# Patient Record
Sex: MALE | Race: WHITE | NOT HISPANIC OR LATINO | Employment: OTHER | URBAN - METROPOLITAN AREA
[De-identification: names, ages, dates, MRNs, and addresses within clinical notes are randomized per-mention and may not be internally consistent; named-entity substitution may affect disease eponyms.]

---

## 2018-01-24 ENCOUNTER — OFFICE VISIT (OUTPATIENT)
Dept: URGENT CARE | Facility: CLINIC | Age: 38
End: 2018-01-24
Payer: COMMERCIAL

## 2018-01-24 VITALS
DIASTOLIC BLOOD PRESSURE: 84 MMHG | BODY MASS INDEX: 25.18 KG/M2 | TEMPERATURE: 99 F | WEIGHT: 190 LBS | RESPIRATION RATE: 14 BRPM | HEIGHT: 73 IN | SYSTOLIC BLOOD PRESSURE: 126 MMHG | OXYGEN SATURATION: 99 % | HEART RATE: 95 BPM

## 2018-01-24 DIAGNOSIS — J02.9 PHARYNGITIS, UNSPECIFIED ETIOLOGY: Primary | ICD-10-CM

## 2018-01-24 PROCEDURE — 99213 OFFICE O/P EST LOW 20 MIN: CPT | Mod: S$GLB,,, | Performed by: NURSE PRACTITIONER

## 2018-01-24 RX ORDER — AMOXICILLIN AND CLAVULANATE POTASSIUM 875; 125 MG/1; MG/1
1 TABLET, FILM COATED ORAL 2 TIMES DAILY
Qty: 20 TABLET | Refills: 0 | Status: SHIPPED | OUTPATIENT
Start: 2018-01-24 | End: 2018-02-03

## 2018-01-24 NOTE — PROGRESS NOTES
"Subjective:       Patient ID: Michael Zuñiga is a 37 y.o. male.    Vitals:  height is 6' 1" (1.854 m) and weight is 86.2 kg (190 lb). His temperature is 98.8 °F (37.1 °C). His blood pressure is 126/84 and his pulse is 95. His respiration is 14 and oxygen saturation is 99%.     Chief Complaint: Sore Throat    Pt is c/o sore throat and pain swallowing that started saturday. Pt also has sinus congestion that started two weeks ago. Pt tried a decongestant  once but it didn't help. Pt denies ear symptoms. Pt says he has hx of strep. Pt denies coughing.      Sore Throat    This is a new problem. The current episode started in the past 7 days. The problem has been gradually worsening. The pain is worse on the left side. The maximum temperature recorded prior to his arrival was 100.4 - 100.9 F. The pain is moderate. Associated symptoms include congestion, coughing and headaches. Pertinent negatives include no abdominal pain, ear discharge, ear pain, hoarse voice or shortness of breath. He has had exposure to strep. The treatment provided no relief.     Review of Systems   Constitution: Positive for fever and malaise/fatigue. Negative for chills.   HENT: Positive for congestion and sore throat. Negative for ear discharge, ear pain and hoarse voice.    Eyes: Negative for discharge and redness.   Cardiovascular: Negative for chest pain, dyspnea on exertion and leg swelling.   Respiratory: Positive for cough. Negative for shortness of breath, sputum production and wheezing.    Musculoskeletal: Positive for myalgias.   Gastrointestinal: Negative for abdominal pain and nausea.   Neurological: Positive for headaches.       Objective:      Physical Exam   Constitutional: He is oriented to person, place, and time. He appears well-developed and well-nourished. He is cooperative.  Non-toxic appearance. He does not appear ill. No distress.   HENT:   Head: Normocephalic and atraumatic.   Right Ear: Hearing, tympanic membrane, " external ear and ear canal normal.   Left Ear: Hearing, tympanic membrane, external ear and ear canal normal.   Nose: Nose normal. No mucosal edema, rhinorrhea or nasal deformity. No epistaxis. Right sinus exhibits no maxillary sinus tenderness and no frontal sinus tenderness. Left sinus exhibits no maxillary sinus tenderness and no frontal sinus tenderness.   Mouth/Throat: Uvula is midline, oropharynx is clear and moist and mucous membranes are normal. No trismus in the jaw. Normal dentition. No uvula swelling. No posterior oropharyngeal erythema. Tonsillar exudate (right tonsil exudate with both tonsils being beefy red. ).   Eyes: Conjunctivae and lids are normal. No scleral icterus.   Sclera clear bilat   Neck: Trachea normal, full passive range of motion without pain and phonation normal. Neck supple.   Cardiovascular: Normal rate, regular rhythm, normal heart sounds, intact distal pulses and normal pulses.    Pulmonary/Chest: Effort normal and breath sounds normal. No respiratory distress.   Abdominal: Soft. Normal appearance and bowel sounds are normal. He exhibits no distension. There is no tenderness.   Musculoskeletal: Normal range of motion. He exhibits no edema or deformity.   Neurological: He is alert and oriented to person, place, and time. He exhibits normal muscle tone. Coordination normal.   Skin: Skin is warm, dry and intact. He is not diaphoretic. No pallor.   Psychiatric: He has a normal mood and affect. His speech is normal and behavior is normal. Judgment and thought content normal. Cognition and memory are normal.   Nursing note and vitals reviewed.      Assessment:       1. Pharyngitis, unspecified etiology        Plan:         Pharyngitis, unspecified etiology  -     amoxicillin-clavulanate 875-125mg (AUGMENTIN) 875-125 mg per tablet; Take 1 tablet by mouth 2 (two) times daily.  Dispense: 20 tablet; Refill: 0  -     CULTURE, RESPIRATORY  - THROAT  -     C. Trachomatis/N. Gonorrhoeae by AMP  DNA (Throat)

## 2018-01-24 NOTE — PATIENT INSTRUCTIONS
When You Have a Sore Throat    A sore throat can be painful. There are many reasons why you may have a sore throat. Your healthcare provider will work with you to find the cause of your sore throat. He or she will also find the best treatment for you.  What causes a sore throat?  Sore throats can be caused or worsened by:  · Cold or flu viruses  · Bacteria  · Irritants such as tobacco smoke or air pollution  · Acid reflux  A healthy throat  The tonsils are on the sides of the throat near the base of the tongue. They collect viruses and bacteria and help fight infection. The throat (pharynx) is the passage for air. Mucus from the nasal cavity also moves down the passage.  An inflamed throat  The tonsils and pharynx can become inflamed due to a cold or flu virus. Postnasal drip (excess mucus draining from the nasal cavity) can irritate the throat. It can also make the throat or tonsils more likely to be infected by bacteria. Severe, untreated tonsillitis in children or adults can cause a pocket of pus (abscess) to form near the tonsil.  Your evaluation  A medical evaluation can help find the cause of your sore throat. It can also help your healthcare provider choose the best treatment for you. The evaluation may include a health history, physical exam, and diagnostic tests.  Health history  Your healthcare provider may ask you the following:  · How long has the sore throat lasted and how have you been treating it?  · Do you have any other symptoms, such as body aches, fever, or cough?  · Does your sore throat recur? If so, how often? How many days of school or work have you missed because of a sore throat?  · Do you have trouble eating or swallowing?  · Have you been told that you snore or have other sleep problems?  · Do you have bad breath?  · Do you cough up bad-tasting mucus?  Physical exam  During the exam, your healthcare provider checks your ears, nose, and throat for problems. He or she also checks for  "swelling in the neck, and may listen to your chest.  Possible tests  Other tests your healthcare provider may perform include:  · A throat swab to check for bacteria such as streptococcus (the bacteria that causes strep throat)  · A blood test to check for mononucleosis (a viral infection)  · A chest X-ray to rule out pneumonia, especially if you have a cough  Treating a sore throat  Treatment depends on many factors. What is the likely cause? Is the problem recent? Does it keep coming back? In many cases, the best thing to do is to treat the symptoms, rest, and let the problem heal itself. Antibiotics may help clear up some bacterial infections. For cases of severe or recurring tonsillitis, the tonsils may need to be removed.  Relieving your symptoms  · Dont smoke, and avoid secondhand smoke.  · For children, try throat sprays or Popsicles. Adults and older children may try lozenges.  · Drink warm liquids to soothe the throat and help thin mucus. Avoid alcohol, spicy foods, and acidic drinks such as orange juice. These can irritate the throat.  · Gargle with warm saltwater (1 teaspoon of salt to 8 ounces of warm water).  · Use a humidifier to keep air moist and relieve throat dryness.  · Try over-the-counter pain relievers such as acetaminophen or ibuprofen. Use as directed, and dont exceed the recommended dose. Dont give aspirin to children.   Are antibiotics needed?  If your sore throat is due to a bacterial infection, antibiotics may speed healing and prevent complications. Although group A streptococcus ("strep throat" or GAS) is the major treatable infection for a sore throat, GAS causes only 5% to 15% of sore throats in adults who seek medical care. Most sore throats are caused by cold or flu viruses. And antibiotics dont treat viral illness. In fact, using antibiotics when theyre not needed may produce bacteria that are harder to kill. Your healthcare provider will prescribe antibiotics only if he or " she thinks they are likely to help.  If antibiotics are prescribed  Take the medicine exactly as directed. Be sure to finish your prescription even if youre feeling better. And be sure to ask your healthcare provider or pharmacist what side effects are common and what to do about them.  Is surgery needed?  In some cases, tonsils need to be removed. This is often done as outpatient (same-day) surgery. Your healthcare provider may advise removing the tonsils in cases of:  · Several severe bouts of tonsillitis in a year. Severe episodes include those that lead to missed days of school or work, or that need to be treated with antibiotics.  · Tonsillitis that causes breathing problems during sleep  · Tonsillitis caused by food particles collecting in pouches in the tonsils (cryptic tonsillitis)  Call your healthcare provider if any of the following occur:  · Symptoms worsen, or new symptoms develop.  · Swollen tonsils make breathing difficult.  · The pain is severe enough to keep you from drinking liquids.  · A skin rash, hives, or wheezing develops. Any of these could signal an allergic reaction to antibiotics.  · Symptoms dont improve within a week.  · Symptoms dont improve within 2 to 3 days of starting antibiotics.   Date Last Reviewed: 10/1/2016  © 1507-3424 [employer]. [address] [place] [address], [place] [address]. All rights reserved. This information is not intended as a substitute for professional medical care. Always follow your healthcare professional's instructions.

## 2018-01-27 ENCOUNTER — TELEPHONE (OUTPATIENT)
Dept: URGENT CARE | Facility: CLINIC | Age: 38
End: 2018-01-27

## 2018-01-29 ENCOUNTER — TELEPHONE (OUTPATIENT)
Dept: URGENT CARE | Facility: CLINIC | Age: 38
End: 2018-01-29

## 2018-01-29 LAB
C TRACH RRNA NPH QL NAA+PROBE: NEGATIVE
N GONORRHOEA RRNA NPH QL NAA+PROBE: NEGATIVE
S PYO THROAT QL CULT: NEGATIVE

## 2018-02-07 ENCOUNTER — TELEPHONE (OUTPATIENT)
Dept: URGENT CARE | Facility: CLINIC | Age: 38
End: 2018-02-07

## 2018-02-07 NOTE — TELEPHONE ENCOUNTER
Per dr. gonzalez - pt has to be seen cannot call in rx for abx    spoke with pt, says he took some of the abx and then stopped and lost them, states he was feeling much better and culture was negative, pt did not have any concerns on january 29th. pt called back today reporting he is not feeling so well again and would like us to just call in more abx, pt was told he should come in for follow up or re-evaluation before we rx more meds that may not be necessary since he has new symptoms and signs as well as over 13 days since his initial visit date.

## 2018-02-07 NOTE — TELEPHONE ENCOUNTER
PATIENT STATES HE LOST HIS RX OF ANTIBIOTIC. DID NOT TAKE ANY AT TIME GIVEN. WOULD LIKE IT CALLED IN AGAIN.

## 2018-07-04 ENCOUNTER — OFFICE VISIT (OUTPATIENT)
Dept: URGENT CARE | Facility: CLINIC | Age: 38
End: 2018-07-04
Payer: COMMERCIAL

## 2018-07-04 VITALS
TEMPERATURE: 98 F | DIASTOLIC BLOOD PRESSURE: 80 MMHG | BODY MASS INDEX: 25.18 KG/M2 | RESPIRATION RATE: 16 BRPM | HEIGHT: 73 IN | SYSTOLIC BLOOD PRESSURE: 120 MMHG | HEART RATE: 84 BPM | OXYGEN SATURATION: 98 % | WEIGHT: 190 LBS

## 2018-07-04 DIAGNOSIS — J02.9 SORE THROAT: Primary | ICD-10-CM

## 2018-07-04 DIAGNOSIS — J02.9 PHARYNGITIS, UNSPECIFIED ETIOLOGY: ICD-10-CM

## 2018-07-04 DIAGNOSIS — H66.91 RIGHT OTITIS MEDIA, UNSPECIFIED OTITIS MEDIA TYPE: ICD-10-CM

## 2018-07-04 DIAGNOSIS — J03.90 TONSILLITIS: ICD-10-CM

## 2018-07-04 LAB
CTP QC/QA: YES
S PYO RRNA THROAT QL PROBE: NEGATIVE

## 2018-07-04 PROCEDURE — 87591 N.GONORRHOEAE DNA AMP PROB: CPT

## 2018-07-04 PROCEDURE — 3008F BODY MASS INDEX DOCD: CPT | Mod: CPTII,S$GLB,, | Performed by: NURSE PRACTITIONER

## 2018-07-04 PROCEDURE — 87880 STREP A ASSAY W/OPTIC: CPT | Mod: QW,S$GLB,, | Performed by: NURSE PRACTITIONER

## 2018-07-04 PROCEDURE — 87070 CULTURE OTHR SPECIMN AEROBIC: CPT

## 2018-07-04 PROCEDURE — 99214 OFFICE O/P EST MOD 30 MIN: CPT | Mod: S$GLB,,, | Performed by: NURSE PRACTITIONER

## 2018-07-04 RX ORDER — AZITHROMYCIN 250 MG/1
TABLET, FILM COATED ORAL
Qty: 2 TABLET | Refills: 0 | Status: SHIPPED | OUTPATIENT
Start: 2018-07-04 | End: 2018-07-09

## 2018-07-04 RX ORDER — AZITHROMYCIN 250 MG/1
TABLET, FILM COATED ORAL
Qty: 2 TABLET | Refills: 0 | Status: SHIPPED | OUTPATIENT
Start: 2018-07-04 | End: 2018-07-04 | Stop reason: SDUPTHER

## 2018-07-04 RX ORDER — AMOXICILLIN AND CLAVULANATE POTASSIUM 875; 125 MG/1; MG/1
1 TABLET, FILM COATED ORAL 2 TIMES DAILY
Qty: 20 TABLET | Refills: 0 | Status: SHIPPED | OUTPATIENT
Start: 2018-07-04 | End: 2018-07-04 | Stop reason: ALTCHOICE

## 2018-07-04 NOTE — PROGRESS NOTES
"Subjective:       Patient ID: Michael Zuñiga is a 37 y.o. male.    Vitals:  height is 6' 1" (1.854 m) and weight is 86.2 kg (190 lb). His temperature is 97.5 °F (36.4 °C). His blood pressure is 120/80 and his pulse is 84. His respiration is 16 and oxygen saturation is 98%.     Chief Complaint: Sore Throat    Pt in for sore throat. Had strep about 2 weeks ago, pt took full corse of abx, forgot the name. Pt gets strep throat a lot. Just saw ENT on Monday to get his tonsils taken out. Pt and his partner made out with a random person, so not sure if he got symptoms from that. Pt denies taking anything for symptoms. They started last night. Pt is from here.       Sore Throat    This is a new problem. The problem has been gradually worsening. The pain is at a severity of 6/10. Associated symptoms include trouble swallowing. Pertinent negatives include no abdominal pain, congestion, coughing, diarrhea, ear pain, headaches, hoarse voice, plugged ear sensation, neck pain, shortness of breath or swollen glands.     Review of Systems   Constitution: Negative for chills, fever and malaise/fatigue.   HENT: Positive for sore throat and trouble swallowing. Negative for congestion, ear pain and hoarse voice.    Eyes: Negative for discharge and redness.   Cardiovascular: Negative for chest pain, dyspnea on exertion and leg swelling.   Respiratory: Negative for cough, shortness of breath, sputum production and wheezing.    Musculoskeletal: Negative for myalgias and neck pain.   Gastrointestinal: Negative for abdominal pain, diarrhea and nausea.   Neurological: Negative for headaches.       Objective:      Physical Exam   Constitutional: He is oriented to person, place, and time. Vital signs are normal. He appears well-developed and well-nourished. He is cooperative.  Non-toxic appearance. He does not have a sickly appearance. He does not appear ill. No distress.   HENT:   Head: Normocephalic and atraumatic.   Right Ear: Hearing, " external ear and ear canal normal. Tympanic membrane is erythematous and bulging.   Left Ear: Hearing, external ear and ear canal normal. Tympanic membrane is bulging.   Nose: Mucosal edema and rhinorrhea present. No nasal deformity. No epistaxis. Right sinus exhibits no maxillary sinus tenderness and no frontal sinus tenderness. Left sinus exhibits no maxillary sinus tenderness and no frontal sinus tenderness.   Mouth/Throat: Uvula is midline and mucous membranes are normal. No trismus in the jaw. Normal dentition. No uvula swelling. Posterior oropharyngeal erythema present. Tonsils are 2+ on the right. Tonsils are 2+ on the left. Tonsillar exudate (left tonsillar exudate noted).   Eyes: Conjunctivae, EOM and lids are normal. Pupils are equal, round, and reactive to light. No scleral icterus.   Sclera clear bilat   Neck: Trachea normal, normal range of motion, full passive range of motion without pain and phonation normal. Neck supple.   Cardiovascular: Normal rate, regular rhythm, S1 normal, S2 normal, normal heart sounds, intact distal pulses and normal pulses.    Pulmonary/Chest: Effort normal and breath sounds normal. No respiratory distress. He has no decreased breath sounds. He has no wheezes. He has no rhonchi. He has no rales.   Abdominal: Soft. Normal appearance and bowel sounds are normal. He exhibits no distension. There is no tenderness.   Musculoskeletal: Normal range of motion. He exhibits no edema or deformity.   Lymphadenopathy:     He has cervical adenopathy.        Right cervical: Superficial cervical adenopathy present.        Left cervical: Superficial cervical adenopathy present.   Neurological: He is alert and oriented to person, place, and time. He exhibits normal muscle tone. Coordination normal.   Skin: Skin is warm, dry and intact. No rash noted. He is not diaphoretic. No pallor.   Psychiatric: He has a normal mood and affect. His speech is normal and behavior is normal. Judgment and  thought content normal. Cognition and memory are normal.   Nursing note and vitals reviewed.      Office Visit on 07/04/2018   Component Date Value Ref Range Status    Rapid Strep A Screen 07/04/2018 Negative  Negative Final     Acceptable 07/04/2018 Yes   Final     Assessment:       1. Sore throat    2. Tonsillitis    3. Right otitis media, unspecified otitis media type        Plan:       37 year old presents to the clinic with sore throat. Reports recently seen by ENT and treated for strep throat 1 week ago.  States the ENT saw him again on Monday and discussed surgery to remove tonsils.  Patient also report recent oral sex with a random luanne this week.     Rapid Strep negative.  We discussed completing a throat culture and GC culture.  Patient agrees with plan of care.  Had extensive discussion of concerns related to viral vs bacterial pharyngitis.  Patient also concerned and asked about being a strep carrier.  Advised to follow up with his ENT regarding this concern.      Due to the risk factors for GC infection and current OM, covering with Azithromycin at ths time.  Also informed that if additional treatment is needed when cultures return, we will manage accordingaly at that time.  Reviewed remaining discharge instructions.  Patient verbalized understanding and agrees with plan of care.      Sore throat  -     POCT rapid strep A    Tonsillitis  -     Throat culture  -     Misc. C trach/N gonor Amplified RNA Throat  -     azithromycin (ZITHROMAX Z-ITZEL) 250 MG tablet; Take 2 tablets (500 mg) on  Day 1,  followed by 1 tablet (250 mg) once daily on Days 2 through 5.  Dispense: 2 tablet; Refill: 0    Right otitis media, unspecified otitis media type  -     Discontinue: amoxicillin-clavulanate 875-125mg (AUGMENTIN) 875-125 mg per tablet; Take 1 tablet by mouth 2 (two) times daily. for 10 days  Dispense: 20 tablet; Refill: 0  -     azithromycin (ZITHROMAX Z-ITZEL) 250 MG tablet; Take 2 tablets (500 mg)  on  Day 1,  followed by 1 tablet (250 mg) once daily on Days 2 through 5.  Dispense: 2 tablet; Refill: 0      Patient Instructions   Ear Infection:  Take full course of antibiotics as prescribed.  Humidifier use at home.  Warm compresses to affected ear  Elevate head on a pillow at night   Flonase Nasal Spray as directed for nasal congestion  Over the Counter Claritin or Zyrtec (plain) as directed for allergy symptoms  Tylenol or Motrin every 4 - 6 hours as needed for fever or ear pain.  Follow up with your PCP in 1 week of initiating antibiotics or sooner for no improvement in symptoms  Follow up in the ER for any worsening of symptoms such as new fever, increasing ear pain, neck stiffness, shortness of breath, etc.  If you smoke, stop smoking.                                                            Pharyngitis   If your condition worsens or fails to improve we recommend that you receive another evaluation at the ER immediately or contact your PCP to discuss your concerns or return here. You must understand that you've received an urgent care treatment only and that you may be released before all your medical problems are known or treated. You the patient will arrange for followup care as instructed.   The majority of all sore throats or tonsillitis are viral and antibiotics will not treat this.     If the strep test performed in office was Positive:  - Complete the full course of antibiotics given  - Drink plenty of cool liquids while avoid any beverage or food that can irritate your throat (acidic, spicy or salty foods).  - Throw away your toothbrush now and when you complete your antibiotics.    If the strep culture was done and returns negative in 3-5 days and you are still having a sore throat, you may need to get a mono spot test done or repeated.   Tylenol or ibuprofen for pain may help as long as you are not allergic to these meds or have a medical condition such as stomach ulcers, liver or kidney  disease or taking blood thinners etc that would   prevent you from using these medications.   Rest and fluids will help as well.   If you were prescribed antibiotics and are female and on BCP use additional methods to prevent pregnancy while on the antibiotics and for one cycle after.     STD testing  You were tested for the following STDs on today: Gonorrhea and Chlamydia   As far the STD testing, we may hold off on any medications till the labs result. We will phone in medication for you if needed.  If we have decided to treat you now be sure to take all the meds as directed.  If you need more meds when the labs result we will call you and phone them in for you.  If you do test positive for STDs you should be retested in about 6 weeks again in 6 monts to ensure true negative results.    Increase condom use to prevent further occurance.  Notify sexual partners of the need for testing.  Complete ALL medication prescribed.  NO sexual intercourse for 7 days after treatment.      Today's testing will give no crediable information if you have unprotected sexual activities going forward.     Middle Ear Infection (Adult)  You have an infection of the middle ear, the space behind the eardrum. This is also called acute otitis media (AOM). Sometimes it is caused by the common cold. This is because congestion can block the internal passage (eustachian tube) that drains fluid from the middle ear. When the middle ear fills with fluid, bacteria can grow there and cause an infection. Oral antibiotics are used to treat this illness, not ear drops. Symptoms usually start to improve within 1 to 2 days of treatment.    Home care  The following are general care guidelines:  · Finish all of the antibiotic medicine given, even though you may feel better after the first few days.  · You may use over-the-counter medicine, such as acetaminophen or ibuprofen, to control pain and fever, unless something else was prescribed. If you have  chronic liver or kidney disease or have ever had a stomach ulcer or gastrointestinal bleeding, talk with your healthcare provider before using these medicines. Do not give aspirin to anyone under 18 years of age who has a fever. It may cause severe illness or death.  Follow-up care  Follow up with your healthcare provider, or as advised, in 2 weeks if all symptoms have not gotten better, or if hearing doesn't go back to normal within 1 month.  When to seek medical advice  Call your healthcare provider right away if any of these occur:  · Ear pain gets worse or does not improve after 3 days of treatment  · Unusual drowsiness or confusion  · Neck pain, stiff neck, or headache  · Fluid or blood draining from the ear canal  · Fever of 100.4°F (38°C) or as advised   · Seizure  Date Last Reviewed: 6/1/2016 © 2000-2017 [employer]. [address] [place] [address]. All rights reserved. This information is not intended as a substitute for professional medical care. Always follow your healthcare professional's instructions.        Pharyngitis (Sore Throat), Report Pending    Pharyngitis (sore throat) is often due to a virus. It can also be caused by the streptococcus, or strep, bacterium, often called strep throat. Both viral and strep infections can cause throat pain that is worse when swallowing, aching all over with headache, and fever. Both types of infections are contagious. They may be spread by coughing, kissing, or touching others after touching your mouth or nose.  A test has been done to find out whether you (or your child, if your child is the patient) have strep throat. Call this facility or your healthcare provider if you were not given your test results. If the test is positive for strep infection, you will need to take antibiotic medicines. A prescription can be called into your pharmacy at that time. If the test is negative, you probably have a viral pharyngitis. This does not need to  be treated with antibiotics. Until you receive the results of the strep test, you should stay home from work. If your child is being tested, he or she should stay home from school.  Home care  · Rest at home. Drink plenty of fluids so you won't get dehydrated.  · If the test is positive for strep, don't go to work or school for the first 2 days of taking the antibiotics. After this time, you will not be contagious. You can then return to work or school if you are feeling better.   · Take the antibiotic medicine for the full 10 days, even if you feel better. This is very important to make sure the infection is treated. It is also important to prevent drug-resistant germs from developing. If you were given an antibiotic shot, you won't need more antibiotics.  · For children: Use acetaminophen for fever, fussiness, or discomfort. In infants older than 6 months of age, you may use ibuprofen instead of acetaminophen. Talk with your child's healthcare provider before giving these medicines if your child has chronic liver or kidney disease or ever had a stomach ulcer or GI bleeding. Never give aspirin to a child under 18 years of age who is ill with a fever. It may cause severe liver damage.  · For adults: Use acetaminophen or ibuprofen to control pain or fever, unless another medicine was prescribed for this. Talk with your healthcare provider before taking these medicines if you have chronic liver or kidney disease or ever had a stomach ulcer or GI bleeding.  · Use throat lozenges or numbing throat sprays to help reduce pain. Gargling with warm salt water will also help reduce throat pain. For this, dissolve 1/2 teaspoon of salt in 1 glass of warm water. To help soothe a sore throat, children can sip on juice or a popsicle. Children 5 years and older can also suck on a lollipop or hard candy.  · Don't eat salty or spicy foods. These can irritate the throat.  Follow-up care  Follow up with your healthcare provider or our  staff if you don't get better over the next week.  When to seek medical advice  Call your healthcare provider right away if any of these occur:  · Fever as directed by your healthcare provider. For children, seek care if:  ¨ Your child is of any age and has repeated fevers above 104°F (40°C).  ¨ Your child is younger than 2 years of age and has a fever of 100.4°F (38°C) that continues for more than 1 day.  ¨ Your child is 2 years old or older and has a fever of 100.4°F (38°C) that continues for more than 3 days.  · New or worsening ear pain, sinus pain, or headache  · Painful lumps in the back of neck  · Stiff neck  · Lymph nodes are getting larger  · Inability to swallow liquids, excessive drooling, or inability to open mouth wide due to throat pain  · Signs of dehydration (very dark urine or no urine, sunken eyes, dizziness)  · Trouble breathing or noisy breathing  · Muffled voice  · New rash  · Child appears to be getting sicker  Date Last Reviewed: 4/13/2015  © 5298-7363 [employer]. [address] [place] [address]. All rights reserved. This information is not intended as a substitute for professional medical care. Always follow your healthcare professional's instructions.        Pharyngitis (Sore Throat), Report Pending    Pharyngitis (sore throat) is often due to a virus. It can also be caused by the streptococcus, or strep, bacterium, often called strep throat. Both viral and strep infections can cause throat pain that is worse when swallowing, aching all over with headache, and fever. Both types of infections are contagious. They may be spread by coughing, kissing, or touching others after touching your mouth or nose.  A test has been done to find out whether you (or your child, if your child is the patient) have strep throat. Call this facility or your healthcare provider if you were not given your test results. If the test is positive for strep infection, you will need to  take antibiotic medicines. A prescription can be called into your pharmacy at that time. If the test is negative, you probably have a viral pharyngitis. This does not need to be treated with antibiotics. Until you receive the results of the strep test, you should stay home from work. If your child is being tested, he or she should stay home from school.  Home care  · Rest at home. Drink plenty of fluids so you won't get dehydrated.  · If the test is positive for strep, don't go to work or school for the first 2 days of taking the antibiotics. After this time, you will not be contagious. You can then return to work or school if you are feeling better.   · Take the antibiotic medicine for the full 10 days, even if you feel better. This is very important to make sure the infection is treated. It is also important to prevent drug-resistant germs from developing. If you were given an antibiotic shot, you won't need more antibiotics.  · For children: Use acetaminophen for fever, fussiness, or discomfort. In infants older than 6 months of age, you may use ibuprofen instead of acetaminophen. Talk with your child's healthcare provider before giving these medicines if your child has chronic liver or kidney disease or ever had a stomach ulcer or GI bleeding. Never give aspirin to a child under 18 years of age who is ill with a fever. It may cause severe liver damage.  · For adults: Use acetaminophen or ibuprofen to control pain or fever, unless another medicine was prescribed for this. Talk with your healthcare provider before taking these medicines if you have chronic liver or kidney disease or ever had a stomach ulcer or GI bleeding.  · Use throat lozenges or numbing throat sprays to help reduce pain. Gargling with warm salt water will also help reduce throat pain. For this, dissolve 1/2 teaspoon of salt in 1 glass of warm water. To help soothe a sore throat, children can sip on juice or a popsicle. Children 5 years and  older can also suck on a lollipop or hard candy.  · Don't eat salty or spicy foods. These can irritate the throat.  Follow-up care  Follow up with your healthcare provider or our staff if you don't get better over the next week.  When to seek medical advice  Call your healthcare provider right away if any of these occur:  · Fever as directed by your healthcare provider. For children, seek care if:  ¨ Your child is of any age and has repeated fevers above 104°F (40°C).  ¨ Your child is younger than 2 years of age and has a fever of 100.4°F (38°C) that continues for more than 1 day.  ¨ Your child is 2 years old or older and has a fever of 100.4°F (38°C) that continues for more than 3 days.  · New or worsening ear pain, sinus pain, or headache  · Painful lumps in the back of neck  · Stiff neck  · Lymph nodes are getting larger  · Inability to swallow liquids, excessive drooling, or inability to open mouth wide due to throat pain  · Signs of dehydration (very dark urine or no urine, sunken eyes, dizziness)  · Trouble breathing or noisy breathing  · Muffled voice  · New rash  · Child appears to be getting sicker  Date Last Reviewed: 4/13/2015  © 1825-5361 [employer]. [address] [place] [address], [place] [address]. All rights reserved. This information is not intended as a substitute for professional medical care. Always follow your healthcare professional's instructions.

## 2018-07-06 ENCOUNTER — TELEPHONE (OUTPATIENT)
Dept: URGENT CARE | Facility: CLINIC | Age: 38
End: 2018-07-06

## 2018-07-06 LAB
BACTERIA THROAT CULT: NORMAL
C TRACH RRNA SPEC QL NAA+PROBE: NEGATIVE
C.TRACH RNA SOURCE: NORMAL
N GONORRHOEA RRNA SPEC QL NAA+PROBE: NEGATIVE
N.GONORROHEAE, AMP RNA SOURCE: NORMAL

## 2018-07-06 NOTE — TELEPHONE ENCOUNTER
pt called asking for lab results. gave pt negative results for the throat culture. Told pt that the CG/CT swab results were not in yet.

## 2018-07-09 ENCOUNTER — TELEPHONE (OUTPATIENT)
Dept: URGENT CARE | Facility: CLINIC | Age: 38
End: 2018-07-09

## 2019-09-09 ENCOUNTER — OFFICE VISIT (OUTPATIENT)
Dept: URGENT CARE | Facility: CLINIC | Age: 39
End: 2019-09-09
Payer: COMMERCIAL

## 2019-09-09 VITALS
WEIGHT: 190 LBS | SYSTOLIC BLOOD PRESSURE: 143 MMHG | TEMPERATURE: 99 F | RESPIRATION RATE: 18 BRPM | BODY MASS INDEX: 25.18 KG/M2 | DIASTOLIC BLOOD PRESSURE: 82 MMHG | HEART RATE: 75 BPM | OXYGEN SATURATION: 99 % | HEIGHT: 73 IN

## 2019-09-09 DIAGNOSIS — J02.9 SORE THROAT: ICD-10-CM

## 2019-09-09 DIAGNOSIS — Z87.09 HISTORY OF RECURRENT TONSILLITIS: ICD-10-CM

## 2019-09-09 DIAGNOSIS — J03.90 EXUDATIVE TONSILLITIS: Primary | ICD-10-CM

## 2019-09-09 LAB
CTP QC/QA: YES
CTP QC/QA: YES
HETEROPH AB SER QL: NEGATIVE
S PYO RRNA THROAT QL PROBE: NEGATIVE

## 2019-09-09 PROCEDURE — 99214 PR OFFICE/OUTPT VISIT, EST, LEVL IV, 30-39 MIN: ICD-10-PCS | Mod: S$GLB,,, | Performed by: PHYSICIAN ASSISTANT

## 2019-09-09 PROCEDURE — 86308 POCT INFECTIOUS MONONUCLEOSIS: ICD-10-PCS | Mod: QW,S$GLB,, | Performed by: PHYSICIAN ASSISTANT

## 2019-09-09 PROCEDURE — 3008F PR BODY MASS INDEX (BMI) DOCUMENTED: ICD-10-PCS | Mod: CPTII,S$GLB,, | Performed by: PHYSICIAN ASSISTANT

## 2019-09-09 PROCEDURE — 87880 STREP A ASSAY W/OPTIC: CPT | Mod: QW,S$GLB,, | Performed by: PHYSICIAN ASSISTANT

## 2019-09-09 PROCEDURE — 87147 CULTURE TYPE IMMUNOLOGIC: CPT | Mod: 59

## 2019-09-09 PROCEDURE — 87081 CULTURE SCREEN ONLY: CPT

## 2019-09-09 PROCEDURE — 86308 HETEROPHILE ANTIBODY SCREEN: CPT | Mod: QW,S$GLB,, | Performed by: PHYSICIAN ASSISTANT

## 2019-09-09 PROCEDURE — 87880 POCT RAPID STREP A: ICD-10-PCS | Mod: QW,S$GLB,, | Performed by: PHYSICIAN ASSISTANT

## 2019-09-09 PROCEDURE — 99214 OFFICE O/P EST MOD 30 MIN: CPT | Mod: S$GLB,,, | Performed by: PHYSICIAN ASSISTANT

## 2019-09-09 PROCEDURE — 3008F BODY MASS INDEX DOCD: CPT | Mod: CPTII,S$GLB,, | Performed by: PHYSICIAN ASSISTANT

## 2019-09-09 RX ORDER — KETOCONAZOLE 20 MG/G
CREAM TOPICAL
Refills: 1 | COMMUNITY
Start: 2019-07-10

## 2019-09-09 RX ORDER — AMOXICILLIN AND CLAVULANATE POTASSIUM 875; 125 MG/1; MG/1
1 TABLET, FILM COATED ORAL 2 TIMES DAILY
Qty: 20 TABLET | Refills: 0 | Status: SHIPPED | OUTPATIENT
Start: 2019-09-09 | End: 2019-09-19

## 2019-09-09 RX ORDER — AMOXICILLIN 875 MG/1
875 TABLET, FILM COATED ORAL 2 TIMES DAILY
Refills: 0 | COMMUNITY
Start: 2019-07-11 | End: 2019-09-09 | Stop reason: ALTCHOICE

## 2019-09-09 RX ORDER — PREDNISONE 10 MG/1
20 TABLET ORAL DAILY
Qty: 6 TABLET | Refills: 0 | Status: SHIPPED | OUTPATIENT
Start: 2019-09-09 | End: 2019-09-12

## 2019-09-09 RX ORDER — CYCLOBENZAPRINE HCL 10 MG
10 TABLET ORAL EVERY 8 HOURS PRN
Refills: 0 | COMMUNITY
Start: 2019-07-11

## 2019-09-09 RX ORDER — VARENICLINE TARTRATE 0.5 (11)-1
KIT ORAL
COMMUNITY
Start: 2018-11-05

## 2019-09-09 RX ORDER — ALPRAZOLAM 0.25 MG/1
TABLET ORAL
COMMUNITY
Start: 2018-12-07

## 2019-09-09 RX ORDER — METHOCARBAMOL 500 MG/1
TABLET, FILM COATED ORAL
Refills: 2 | COMMUNITY
Start: 2019-06-29

## 2019-09-09 RX ORDER — IBUPROFEN 800 MG/1
TABLET ORAL
Refills: 0 | COMMUNITY
Start: 2019-07-11

## 2019-09-09 NOTE — PROGRESS NOTES
"Subjective:       Patient ID: Michael Zuñiga is a 38 y.o. male.    Vitals:  height is 6' 1" (1.854 m) and weight is 86.2 kg (190 lb). His temperature is 98.9 °F (37.2 °C). His blood pressure is 143/82 (abnormal) and his pulse is 75. His respiration is 18 and oxygen saturation is 99%.     Chief Complaint: Sore Throat (started 2 days ago)    Patient presents with complaint of sore throat for 2 days. He notes he has had Strep 3 times this year already and was put on amoxil and a Zpak which he believes he's become resistent to. Denies any fever, sinus congestion, runny nose, cough.    Sore Throat    This is a chronic problem. The current episode started in the past 7 days. The problem has been unchanged. There has been no fever. The pain is at a severity of 3/10. The pain is mild. Associated symptoms include a hoarse voice and trouble swallowing. Pertinent negatives include no congestion, coughing, diarrhea, headaches, shortness of breath or vomiting. He has had exposure to strep. He has tried NSAIDs (ibuprofen) for the symptoms. The treatment provided mild relief.       Constitution: Negative for chills, fatigue and fever.   HENT: Positive for sore throat and trouble swallowing. Negative for congestion.    Neck: Negative for painful lymph nodes.   Cardiovascular: Negative for chest pain and leg swelling.   Eyes: Negative for double vision and blurred vision.   Respiratory: Negative for cough and shortness of breath.    Gastrointestinal: Negative for nausea, vomiting and diarrhea.   Genitourinary: Negative for dysuria, frequency and urgency.   Musculoskeletal: Negative for joint pain, joint swelling, muscle cramps and muscle ache.   Skin: Negative for color change, pale and rash.   Allergic/Immunologic: Negative for seasonal allergies.   Neurological: Negative for dizziness, history of vertigo, light-headedness, passing out and headaches.   Hematologic/Lymphatic: Negative for swollen lymph nodes, easy " bruising/bleeding and history of blood clots. Does not bruise/bleed easily.   Psychiatric/Behavioral: Negative for nervous/anxious, sleep disturbance and depression. The patient is not nervous/anxious.        Objective:      Physical Exam   Constitutional: He is oriented to person, place, and time. He appears well-developed and well-nourished. He is cooperative.  Non-toxic appearance. He does not appear ill. No distress.   HENT:   Head: Normocephalic and atraumatic.   Right Ear: Hearing, tympanic membrane, external ear and ear canal normal.   Left Ear: Hearing, tympanic membrane, external ear and ear canal normal.   Nose: Nose normal. No mucosal edema, rhinorrhea or nasal deformity. No epistaxis. Right sinus exhibits no maxillary sinus tenderness and no frontal sinus tenderness. Left sinus exhibits no maxillary sinus tenderness and no frontal sinus tenderness.   Mouth/Throat: Uvula is midline and mucous membranes are normal. No trismus in the jaw. Normal dentition. No uvula swelling. Posterior oropharyngeal erythema present. Tonsils are 2+ on the right. Tonsils are 2+ on the left. Tonsillar exudate.   Eyes: Conjunctivae and lids are normal. No scleral icterus.   Sclera clear bilat   Neck: Trachea normal, full passive range of motion without pain and phonation normal. Neck supple.   Cardiovascular: Normal rate, regular rhythm, normal heart sounds, intact distal pulses and normal pulses.   Pulmonary/Chest: Effort normal and breath sounds normal. No respiratory distress. He has no decreased breath sounds. He has no wheezes. He has no rhonchi.   Abdominal: Soft. Normal appearance and bowel sounds are normal. He exhibits no distension. There is no tenderness.   Musculoskeletal: Normal range of motion. He exhibits no edema or deformity.   Lymphadenopathy:        Head (right side): Tonsillar adenopathy present. No submental, no submandibular, no preauricular and no posterior auricular adenopathy present.        Head (left  side): Tonsillar adenopathy present. No submental, no submandibular, no preauricular and no posterior auricular adenopathy present.     He has no cervical adenopathy.   Neurological: He is alert and oriented to person, place, and time. He exhibits normal muscle tone. Coordination normal.   Skin: Skin is warm, dry and intact. He is not diaphoretic. No pallor.   Psychiatric: He has a normal mood and affect. His speech is normal and behavior is normal. Judgment and thought content normal. Cognition and memory are normal.   Nursing note and vitals reviewed.        Recent Results (from the past 48 hour(s))   POCT rapid strep A    Collection Time: 09/09/19 10:08 AM   Result Value Ref Range    Rapid Strep A Screen Negative Negative     Acceptable Yes    POCT Infectious mononucleosis antibody    Collection Time: 09/09/19 10:15 AM   Result Value Ref Range    Monospot Negative Negative     Acceptable Yes    ]    Assessment:       1. Exudative tonsillitis    2. Sore throat    3. History of recurrent tonsillitis        Plan:         Exudative tonsillitis  -     amoxicillin-clavulanate 875-125mg (AUGMENTIN) 875-125 mg per tablet; Take 1 tablet by mouth 2 (two) times daily. for 10 days  Dispense: 20 tablet; Refill: 0  -     Strep A culture, throat  -     predniSONE (DELTASONE) 10 MG tablet; Take 2 tablets (20 mg total) by mouth once daily. for 3 days  Dispense: 6 tablet; Refill: 0    Sore throat  -     POCT rapid strep A  -     POCT Infectious mononucleosis antibody    History of recurrent tonsillitis  -     Ambulatory referral to ENT       Patient Instructions   General Instructions for  URI:    Below are suggestions for symptomatic relief:              -Tylenol every 4 hours OR ibuprofen every 6 hours as needed for pain/fever.              -Salt water gargles to soothe throat pain.              -Chloroseptic spray also helps to numb throat pain.              -Nasal saline spray reduces inflammation  and dryness.              -Warm face compresses to help with facial sinus pain/pressure.              -Vicks vapor rub at night.              -Flonase OTC or Nasacort OTC for nasal congestion.              -Simple foods like chicken noodle soup.              -Delsym helps with coughing at night              -Zyrtec/Claritin during the day & Benadryl at night may help with allergies.                If you DO NOT have Hypertension or any history of palpitations, it is ok to take over the counter Sudafed or Mucinex D or Allegra-D or Claritin-D or Zyrtec-D.  If you do take one of the above, it is ok to combine that with plain over the counter Mucinex or Allegra or Claritin or Zyrtec. If, for example, you are taking Zyrtec -D, you can combine that with Mucinex, but not Mucinex-D.  If you are taking Mucinex-D, you can combine that with plain Allegra or Claritin or Zyrtec.   If you DO have Hypertension or palpitations, it is safe to take Coricidin HBP for relief of sinus symptoms.     Please follow up with your primary care provider within 2-5 days if your signs and symptoms have not resolved or worsen.      If your condition worsens or fails to improve we recommend that you receive another evaluation at the emergency room immediately or contact your primary medical clinic to discuss your concerns.   You must understand that you have received an Urgent Care treatment only and that you may be released before all of your medical problems are known or treated. You, the patient, will arrange for follow up care as instructed.       Pharyngitis: Strep (Presumed)    You have pharyngitis (sore throat). The cause is thought to be the streptococcus, or strep, bacterium. Strep throat infection can cause throat pain that is worse when swallowing, aching all over, headache, and fever. The infection may be spread by coughing, kissing, or touching others after touching your mouth or nose. Antibiotic medications are given to treat the  infection.  Home care  · Rest at home. Drink plenty of fluids to avoid dehydration.  · No work or school for the first 2 days of taking the antibiotics. After this time, you will not be contagious. You can then return to work or school if you are feeling better.   · The antibiotic medication must be taken for the full 10 days, even if you feel better. This is very important to ensure the infection is treated. It is also important to prevent drug-resistant organisms from developing. If you were given an antibiotic shot, no more antibiotics are needed.  · You may use acetaminophen or ibuprofen to control pain or fever, unless another medicine was prescribed for this. If you have chronic liver or kidney disease or ever had a stomach ulcer or GI bleeding, talk with your doctor before using these medicines.  · Throat lozenges or a throat-numbing sprays can help reduce throat pain. Gargling with warm salt water can also help. Dissolve 1/2 teaspoon of salt in 1 8 ounce glass of warm water.   · Avoid salty or spicy foods, which can irritate the throat.  Follow-up care  Follow up with your healthcare provider or our staff if you are not improving over the next week.  When to seek medical advice  Call your healthcare provider right away if any of these occur:  · Fever as directed by your doctor.   · New or worsening ear pain, sinus pain, or headache  · Painful lumps in the back of neck  · Stiff neck  · Lymph nodes are getting larger  · Inability to swallow liquids, excessive drooling, or inability to open mouth wide due to throat pain  · Signs of dehydration (very dark urine or no urine, sunken eyes, dizziness)  · Trouble breathing or noisy breathing  · Muffled voice  · New rash  Date Last Reviewed: 4/13/2015  © 0999-0540 [employer]. [address] [place] [address], [place] [address]. All rights reserved. This information is not intended as a substitute for professional medical care. Always follow your healthcare  professional's instructions.

## 2019-09-09 NOTE — PATIENT INSTRUCTIONS
General Instructions for  URI:    Below are suggestions for symptomatic relief:              -Tylenol every 4 hours OR ibuprofen every 6 hours as needed for pain/fever.              -Salt water gargles to soothe throat pain.              -Chloroseptic spray also helps to numb throat pain.              -Nasal saline spray reduces inflammation and dryness.              -Warm face compresses to help with facial sinus pain/pressure.              -Vicks vapor rub at night.              -Flonase OTC or Nasacort OTC for nasal congestion.              -Simple foods like chicken noodle soup.              -Delsym helps with coughing at night              -Zyrtec/Claritin during the day & Benadryl at night may help with allergies.                If you DO NOT have Hypertension or any history of palpitations, it is ok to take over the counter Sudafed or Mucinex D or Allegra-D or Claritin-D or Zyrtec-D.  If you do take one of the above, it is ok to combine that with plain over the counter Mucinex or Allegra or Claritin or Zyrtec. If, for example, you are taking Zyrtec -D, you can combine that with Mucinex, but not Mucinex-D.  If you are taking Mucinex-D, you can combine that with plain Allegra or Claritin or Zyrtec.   If you DO have Hypertension or palpitations, it is safe to take Coricidin HBP for relief of sinus symptoms.     Please follow up with your primary care provider within 2-5 days if your signs and symptoms have not resolved or worsen.      If your condition worsens or fails to improve we recommend that you receive another evaluation at the emergency room immediately or contact your primary medical clinic to discuss your concerns.   You must understand that you have received an Urgent Care treatment only and that you may be released before all of your medical problems are known or treated. You, the patient, will arrange for follow up care as instructed.       Pharyngitis: Strep (Presumed)    You have pharyngitis (sore  throat). The cause is thought to be the streptococcus, or strep, bacterium. Strep throat infection can cause throat pain that is worse when swallowing, aching all over, headache, and fever. The infection may be spread by coughing, kissing, or touching others after touching your mouth or nose. Antibiotic medications are given to treat the infection.  Home care  · Rest at home. Drink plenty of fluids to avoid dehydration.  · No work or school for the first 2 days of taking the antibiotics. After this time, you will not be contagious. You can then return to work or school if you are feeling better.   · The antibiotic medication must be taken for the full 10 days, even if you feel better. This is very important to ensure the infection is treated. It is also important to prevent drug-resistant organisms from developing. If you were given an antibiotic shot, no more antibiotics are needed.  · You may use acetaminophen or ibuprofen to control pain or fever, unless another medicine was prescribed for this. If you have chronic liver or kidney disease or ever had a stomach ulcer or GI bleeding, talk with your doctor before using these medicines.  · Throat lozenges or a throat-numbing sprays can help reduce throat pain. Gargling with warm salt water can also help. Dissolve 1/2 teaspoon of salt in 1 8 ounce glass of warm water.   · Avoid salty or spicy foods, which can irritate the throat.  Follow-up care  Follow up with your healthcare provider or our staff if you are not improving over the next week.  When to seek medical advice  Call your healthcare provider right away if any of these occur:  · Fever as directed by your doctor.   · New or worsening ear pain, sinus pain, or headache  · Painful lumps in the back of neck  · Stiff neck  · Lymph nodes are getting larger  · Inability to swallow liquids, excessive drooling, or inability to open mouth wide due to throat pain  · Signs of dehydration (very dark urine or no urine,  sunken eyes, dizziness)  · Trouble breathing or noisy breathing  · Muffled voice  · New rash  Date Last Reviewed: 4/13/2015  © 7604-7694 [employer]. [address] [place] [address], [place] [address]. All rights reserved. This information is not intended as a substitute for professional medical care. Always follow your healthcare professional's instructions.

## 2019-09-11 LAB
BACTERIA THROAT CULT: ABNORMAL
BACTERIA THROAT CULT: ABNORMAL

## 2019-09-13 ENCOUNTER — TELEPHONE (OUTPATIENT)
Dept: URGENT CARE | Facility: CLINIC | Age: 39
End: 2019-09-13

## 2019-09-14 ENCOUNTER — TELEPHONE (OUTPATIENT)
Dept: URGENT CARE | Facility: CLINIC | Age: 39
End: 2019-09-14

## 2019-09-14 NOTE — TELEPHONE ENCOUNTER
Patient called back yesterday and test results were given.  I answered questions he had regarding prior screening and test results.  He has improved on Augmentin and will finish the course.

## 2019-12-17 ENCOUNTER — OFFICE VISIT (OUTPATIENT)
Dept: URGENT CARE | Facility: CLINIC | Age: 39
End: 2019-12-17
Payer: COMMERCIAL

## 2019-12-17 VITALS
SYSTOLIC BLOOD PRESSURE: 124 MMHG | TEMPERATURE: 99 F | OXYGEN SATURATION: 98 % | RESPIRATION RATE: 15 BRPM | HEIGHT: 73 IN | DIASTOLIC BLOOD PRESSURE: 75 MMHG | WEIGHT: 190 LBS | BODY MASS INDEX: 25.18 KG/M2 | HEART RATE: 89 BPM

## 2019-12-17 DIAGNOSIS — J02.9 SORE THROAT: ICD-10-CM

## 2019-12-17 DIAGNOSIS — J03.90 TONSILLITIS: ICD-10-CM

## 2019-12-17 DIAGNOSIS — B96.89 ACUTE BACTERIAL PHARYNGITIS: Primary | ICD-10-CM

## 2019-12-17 DIAGNOSIS — J02.8 ACUTE BACTERIAL PHARYNGITIS: Primary | ICD-10-CM

## 2019-12-17 LAB
CTP QC/QA: YES
S PYO RRNA THROAT QL PROBE: NEGATIVE

## 2019-12-17 PROCEDURE — 96372 THER/PROPH/DIAG INJ SC/IM: CPT | Mod: S$GLB,,, | Performed by: EMERGENCY MEDICINE

## 2019-12-17 PROCEDURE — 99214 OFFICE O/P EST MOD 30 MIN: CPT | Mod: 25,S$GLB,, | Performed by: EMERGENCY MEDICINE

## 2019-12-17 PROCEDURE — 99214 PR OFFICE/OUTPT VISIT, EST, LEVL IV, 30-39 MIN: ICD-10-PCS | Mod: 25,S$GLB,, | Performed by: EMERGENCY MEDICINE

## 2019-12-17 PROCEDURE — 87880 STREP A ASSAY W/OPTIC: CPT | Mod: QW,S$GLB,, | Performed by: EMERGENCY MEDICINE

## 2019-12-17 PROCEDURE — 87880 POCT RAPID STREP A: ICD-10-PCS | Mod: QW,S$GLB,, | Performed by: EMERGENCY MEDICINE

## 2019-12-17 PROCEDURE — 96372 PR INJECTION,THERAP/PROPH/DIAG2ST, IM OR SUBCUT: ICD-10-PCS | Mod: S$GLB,,, | Performed by: EMERGENCY MEDICINE

## 2019-12-17 RX ORDER — PREDNISONE 20 MG/1
TABLET ORAL
Qty: 10 TABLET | Refills: 0 | Status: SHIPPED | OUTPATIENT
Start: 2019-12-17 | End: 2023-05-01

## 2019-12-17 RX ORDER — CEFTRIAXONE 1 G/1
1 INJECTION, POWDER, FOR SOLUTION INTRAMUSCULAR; INTRAVENOUS
Status: COMPLETED | OUTPATIENT
Start: 2019-12-17 | End: 2019-12-17

## 2019-12-17 RX ORDER — BETAMETHASONE SODIUM PHOSPHATE AND BETAMETHASONE ACETATE 3; 3 MG/ML; MG/ML
9 INJECTION, SUSPENSION INTRA-ARTICULAR; INTRALESIONAL; INTRAMUSCULAR; SOFT TISSUE
Status: COMPLETED | OUTPATIENT
Start: 2019-12-17 | End: 2019-12-17

## 2019-12-17 RX ORDER — AMOXICILLIN AND CLAVULANATE POTASSIUM 875; 125 MG/1; MG/1
1 TABLET, FILM COATED ORAL EVERY 12 HOURS
Qty: 20 TABLET | Refills: 0 | Status: SHIPPED | OUTPATIENT
Start: 2019-12-17 | End: 2019-12-27

## 2019-12-17 RX ORDER — HYDROCODONE BITARTRATE AND ACETAMINOPHEN 7.5; 325 MG/1; MG/1
1 TABLET ORAL EVERY 8 HOURS PRN
Qty: 9 TABLET | Refills: 0 | Status: SHIPPED | OUTPATIENT
Start: 2019-12-17 | End: 2019-12-20

## 2019-12-17 RX ADMIN — BETAMETHASONE SODIUM PHOSPHATE AND BETAMETHASONE ACETATE 9 MG: 3; 3 INJECTION, SUSPENSION INTRA-ARTICULAR; INTRALESIONAL; INTRAMUSCULAR; SOFT TISSUE at 11:12

## 2019-12-17 RX ADMIN — CEFTRIAXONE 1 G: 1 INJECTION, POWDER, FOR SOLUTION INTRAMUSCULAR; INTRAVENOUS at 11:12

## 2019-12-17 NOTE — PATIENT INSTRUCTIONS
As discussed, if you develop high fevers, worse pain despite treatment, muffled voice or hot potato voice, deep neck pain not relieved with medication regimen, please go to the ER out of concern of developing a peritonsillar abscess which is not currently present.    Rocephin shot 1 g given in clinic  Celestone shot 1.5 cc given in clinic  Prednisone prescription  Augmentin prescription  Ibuprofen or Tylenol as needed for mild-to-moderate pain  Norco prescription 2-3 day supply for severe pain. This may be necessary so that you can hydrate and keep your nutrition up.    Review strep pharyngitis and tonsillitis sheet   ENT referral done      When You Have a Sore Throat    A sore throat can be painful. There are many reasons why you may have a sore throat. Your healthcare provider will work with you to find the cause of your sore throat. He or she will also find the best treatment for you.  What causes a sore throat?  Sore throats can be caused or worsened by:  · Cold or flu viruses  · Bacteria  · Irritants such as tobacco smoke or air pollution  · Acid reflux  A healthy throat  The tonsils are on the sides of the throat near the base of the tongue. They collect viruses and bacteria and help fight infection. The throat (pharynx) is the passage for air. Mucus from the nasal cavity also moves down the passage.  An inflamed throat  The tonsils and pharynx can become inflamed due to a cold or flu virus. Postnasal drip (excess mucus draining from the nasal cavity) can irritate the throat. It can also make the throat or tonsils more likely to be infected by bacteria. Severe, untreated tonsillitis in children or adults can cause a pocket of pus (abscess) to form near the tonsil.  Your evaluation  A medical evaluation can help find the cause of your sore throat. It can also help your healthcare provider choose the best treatment for you. The evaluation may include a health history, physical exam, and diagnostic tests.  Health  history  Your healthcare provider may ask you the following:  · How long has the sore throat lasted and how have you been treating it?  · Do you have any other symptoms, such as body aches, fever, or cough?  · Does your sore throat recur? If so, how often? How many days of school or work have you missed because of a sore throat?  · Do you have trouble eating or swallowing?  · Have you been told that you snore or have other sleep problems?  · Do you have bad breath?  · Do you cough up bad-tasting mucus?  Physical exam  During the exam, your healthcare provider checks your ears, nose, and throat for problems. He or she also checks for swelling in the neck, and may listen to your chest.  Possible tests  Other tests your healthcare provider may perform include:  · A throat swab to check for bacteria such as streptococcus (the bacteria that causes strep throat)  · A blood test to check for mononucleosis (a viral infection)  · A chest X-ray to rule out pneumonia, especially if you have a cough  Treating a sore throat  Treatment depends on many factors. What is the likely cause? Is the problem recent? Does it keep coming back? In many cases, the best thing to do is to treat the symptoms, rest, and let the problem heal itself. Antibiotics may help clear up some bacterial infections. For cases of severe or recurring tonsillitis, the tonsils may need to be removed.  Relieving your symptoms  · Dont smoke, and avoid secondhand smoke.  · For children, try throat sprays or Popsicles. Adults and older children may try lozenges.  · Drink warm liquids to soothe the throat and help thin mucus. Avoid alcohol, spicy foods, and acidic drinks such as orange juice. These can irritate the throat.  · Gargle with warm saltwater (1 teaspoon of salt to 8 ounces of warm water).  · Use a humidifier to keep air moist and relieve throat dryness.  · Try over-the-counter pain relievers such as acetaminophen or ibuprofen. Use as directed, and dont  "exceed the recommended dose. Dont give aspirin to children.   Are antibiotics needed?  If your sore throat is due to a bacterial infection, antibiotics may speed healing and prevent complications. Although group A streptococcus ("strep throat" or GAS) is the major treatable infection for a sore throat, GAS causes only 5% to 15% of sore throats in adults who seek medical care. Most sore throats are caused by cold or flu viruses. And antibiotics dont treat viral illness. In fact, using antibiotics when theyre not needed may produce bacteria that are harder to kill. Your healthcare provider will prescribe antibiotics only if he or she thinks they are likely to help.  If antibiotics are prescribed  Take the medicine exactly as directed. Be sure to finish your prescription even if youre feeling better. And be sure to ask your healthcare provider or pharmacist what side effects are common and what to do about them.  Is surgery needed?  In some cases, tonsils need to be removed. This is often done as outpatient (same-day) surgery. Your healthcare provider may advise removing the tonsils in cases of:  · Several severe bouts of tonsillitis in a year. Severe episodes include those that lead to missed days of school or work, or that need to be treated with antibiotics.  · Tonsillitis that causes breathing problems during sleep  · Tonsillitis caused by food particles collecting in pouches in the tonsils (cryptic tonsillitis)  Call your healthcare provider if any of the following occur:  · Symptoms worsen, or new symptoms develop.  · Swollen tonsils make breathing difficult.  · The pain is severe enough to keep you from drinking liquids.  · A skin rash, hives, or wheezing develops. Any of these could signal an allergic reaction to antibiotics.  · Symptoms dont improve within a week.  · Symptoms dont improve within 2 to 3 days of starting antibiotics.   Date Last Reviewed: 10/1/2016  © 4983-1283 [employer] " [address] [place] [address]. All rights reserved. This information is not intended as a substitute for professional medical care. Always follow your healthcare professional's instructions.

## 2019-12-17 NOTE — PROGRESS NOTES
"Subjective:       Patient ID: Michael Zuñiga is a 39 y.o. male.    Vitals:  height is 6' 1" (1.854 m) and weight is 86.2 kg (190 lb). His temperature is 99.2 °F (37.3 °C). His blood pressure is 124/75 and his pulse is 89. His respiration is 15 and oxygen saturation is 98%.     Chief Complaint: Sore Throat    Patient states he stop smoking last week and now has had sore throat for the past 3days.  He states he gets strep atleast 3-4 times a year  No fevers or chills, no neck pain, severe sore throat with very much difficulties swallowing.  He is frustrated because this is a repeat thing for him and he has already seen the ENT.  He does not have a muffled voice and denies drooling.  His tonsils are markedly enlarged left worse than right however no evidence of a peritonsillar abscess at this point.  He has been given strict precautions of when to go to the ER and notified that we are starting an aggressive regimen.    Sore Throat    This is a new problem. The current episode started in the past 7 days. The problem has been gradually worsening. The pain is worse on the left side. There has been no fever. The pain is at a severity of 7/10. Associated symptoms include headaches, a hoarse voice, swollen glands and trouble swallowing. Pertinent negatives include no congestion, coughing, ear pain, shortness of breath, stridor or vomiting. He has tried nothing for the symptoms.       Constitution: Negative for chills, sweating, fatigue and fever.   HENT: Positive for sore throat, trouble swallowing and voice change. Negative for ear pain, congestion, sinus pain and sinus pressure.    Neck: Negative for painful lymph nodes.   Eyes: Negative for eye redness.   Respiratory: Negative for chest tightness, cough, sputum production, bloody sputum, COPD, shortness of breath, stridor, wheezing and asthma.    Gastrointestinal: Negative for nausea and vomiting.   Musculoskeletal: Negative for muscle ache.   Skin: Negative for rash. "   Allergic/Immunologic: Negative for seasonal allergies and asthma.   Neurological: Positive for headaches.   Hematologic/Lymphatic: Negative for swollen lymph nodes.       Objective:      Physical Exam   Constitutional: He is oriented to person, place, and time. He appears well-developed and well-nourished. He is cooperative.  Non-toxic appearance. He does not have a sickly appearance. He does not appear ill. No distress.   HENT:   Head: Normocephalic and atraumatic.   Right Ear: Hearing, tympanic membrane, external ear and ear canal normal.   Left Ear: Hearing, tympanic membrane, external ear and ear canal normal.   Nose: Nose normal. No mucosal edema, rhinorrhea or nasal deformity. No epistaxis. Right sinus exhibits no maxillary sinus tenderness and no frontal sinus tenderness. Left sinus exhibits no maxillary sinus tenderness and no frontal sinus tenderness.   Mouth/Throat: Uvula is midline and mucous membranes are normal. No trismus in the jaw. Normal dentition. No uvula swelling. Oropharyngeal exudate present. No posterior oropharyngeal edema or posterior oropharyngeal erythema.   Exudate of tonsillitis bilaterally left worse than right uvula is midline.  It does not extend to the peritonsillar region or soft palate and no evidence of drooling or muffled voice or peritonsillar abscess at this point.  Patient given strict precautions.   Eyes: Conjunctivae and lids are normal. No scleral icterus.   Neck: Trachea normal, full passive range of motion without pain and phonation normal. Neck supple. No neck rigidity. No edema and no erythema present.   Bilateral tender mobile anterior cervical lymphadenitis.   Cardiovascular: Normal rate, regular rhythm, normal heart sounds, intact distal pulses and normal pulses.   Pulmonary/Chest: Effort normal and breath sounds normal. No respiratory distress. He has no decreased breath sounds. He has no rhonchi.   Abdominal: Normal appearance.   Musculoskeletal: Normal range of  motion. He exhibits no edema or deformity.   Lymphadenopathy:     He has cervical adenopathy.   Neurological: He is alert and oriented to person, place, and time. He exhibits normal muscle tone. Coordination normal.   Skin: Skin is warm, dry, intact, not diaphoretic and not pale.   Psychiatric: He has a normal mood and affect. His speech is normal. Cognition and memory are normal.   Nursing note and vitals reviewed.        Assessment:       1. Acute bacterial pharyngitis    2. Tonsillitis    3. Sore throat        Plan:         Acute bacterial pharyngitis  -     Ambulatory referral to ENT    Tonsillitis  -     Ambulatory referral to ENT    Sore throat  -     POCT rapid strep A  -     Ambulatory referral to ENT    Other orders  -     betamethasone acetate-betamethasone sodium phosphate injection 9 mg  -     cefTRIAXone injection 1 g  -     predniSONE (DELTASONE) 20 MG tablet; Take 2 tablets daily for 5 days  Dispense: 10 tablet; Refill: 0  -     amoxicillin-clavulanate 875-125mg (AUGMENTIN) 875-125 mg per tablet; Take 1 tablet by mouth every 12 (twelve) hours. for 10 days  Dispense: 20 tablet; Refill: 0  -     HYDROcodone-acetaminophen (NORCO) 7.5-325 mg per tablet; Take 1 tablet by mouth every 8 (eight) hours as needed for Pain.  Dispense: 9 tablet; Refill: 0          Patient Instructions     As discussed, if you develop high fevers, worse pain despite treatment, muffled voice or hot potato voice, deep neck pain not relieved with medication regimen, please go to the ER out of concern of developing a peritonsillar abscess which is not currently present.    Rocephin shot 1 g given in clinic  Celestone shot 1.5 cc given in clinic  Prednisone prescription  Augmentin prescription  Ibuprofen or Tylenol as needed for mild-to-moderate pain  Norco prescription 2-3 day supply for severe pain. This may be necessary so that you can hydrate and keep your nutrition up.    Review strep pharyngitis and tonsillitis sheet   ENT  referral done      When You Have a Sore Throat    A sore throat can be painful. There are many reasons why you may have a sore throat. Your healthcare provider will work with you to find the cause of your sore throat. He or she will also find the best treatment for you.  What causes a sore throat?  Sore throats can be caused or worsened by:  · Cold or flu viruses  · Bacteria  · Irritants such as tobacco smoke or air pollution  · Acid reflux  A healthy throat  The tonsils are on the sides of the throat near the base of the tongue. They collect viruses and bacteria and help fight infection. The throat (pharynx) is the passage for air. Mucus from the nasal cavity also moves down the passage.  An inflamed throat  The tonsils and pharynx can become inflamed due to a cold or flu virus. Postnasal drip (excess mucus draining from the nasal cavity) can irritate the throat. It can also make the throat or tonsils more likely to be infected by bacteria. Severe, untreated tonsillitis in children or adults can cause a pocket of pus (abscess) to form near the tonsil.  Your evaluation  A medical evaluation can help find the cause of your sore throat. It can also help your healthcare provider choose the best treatment for you. The evaluation may include a health history, physical exam, and diagnostic tests.  Health history  Your healthcare provider may ask you the following:  · How long has the sore throat lasted and how have you been treating it?  · Do you have any other symptoms, such as body aches, fever, or cough?  · Does your sore throat recur? If so, how often? How many days of school or work have you missed because of a sore throat?  · Do you have trouble eating or swallowing?  · Have you been told that you snore or have other sleep problems?  · Do you have bad breath?  · Do you cough up bad-tasting mucus?  Physical exam  During the exam, your healthcare provider checks your ears, nose, and throat for problems. He or she also  "checks for swelling in the neck, and may listen to your chest.  Possible tests  Other tests your healthcare provider may perform include:  · A throat swab to check for bacteria such as streptococcus (the bacteria that causes strep throat)  · A blood test to check for mononucleosis (a viral infection)  · A chest X-ray to rule out pneumonia, especially if you have a cough  Treating a sore throat  Treatment depends on many factors. What is the likely cause? Is the problem recent? Does it keep coming back? In many cases, the best thing to do is to treat the symptoms, rest, and let the problem heal itself. Antibiotics may help clear up some bacterial infections. For cases of severe or recurring tonsillitis, the tonsils may need to be removed.  Relieving your symptoms  · Dont smoke, and avoid secondhand smoke.  · For children, try throat sprays or Popsicles. Adults and older children may try lozenges.  · Drink warm liquids to soothe the throat and help thin mucus. Avoid alcohol, spicy foods, and acidic drinks such as orange juice. These can irritate the throat.  · Gargle with warm saltwater (1 teaspoon of salt to 8 ounces of warm water).  · Use a humidifier to keep air moist and relieve throat dryness.  · Try over-the-counter pain relievers such as acetaminophen or ibuprofen. Use as directed, and dont exceed the recommended dose. Dont give aspirin to children.   Are antibiotics needed?  If your sore throat is due to a bacterial infection, antibiotics may speed healing and prevent complications. Although group A streptococcus ("strep throat" or GAS) is the major treatable infection for a sore throat, GAS causes only 5% to 15% of sore throats in adults who seek medical care. Most sore throats are caused by cold or flu viruses. And antibiotics dont treat viral illness. In fact, using antibiotics when theyre not needed may produce bacteria that are harder to kill. Your healthcare provider will prescribe antibiotics only " if he or she thinks they are likely to help.  If antibiotics are prescribed  Take the medicine exactly as directed. Be sure to finish your prescription even if youre feeling better. And be sure to ask your healthcare provider or pharmacist what side effects are common and what to do about them.  Is surgery needed?  In some cases, tonsils need to be removed. This is often done as outpatient (same-day) surgery. Your healthcare provider may advise removing the tonsils in cases of:  · Several severe bouts of tonsillitis in a year. Severe episodes include those that lead to missed days of school or work, or that need to be treated with antibiotics.  · Tonsillitis that causes breathing problems during sleep  · Tonsillitis caused by food particles collecting in pouches in the tonsils (cryptic tonsillitis)  Call your healthcare provider if any of the following occur:  · Symptoms worsen, or new symptoms develop.  · Swollen tonsils make breathing difficult.  · The pain is severe enough to keep you from drinking liquids.  · A skin rash, hives, or wheezing develops. Any of these could signal an allergic reaction to antibiotics.  · Symptoms dont improve within a week.  · Symptoms dont improve within 2 to 3 days of starting antibiotics.   Date Last Reviewed: 10/1/2016  © 1063-2535 [employer]. [address] [place] [address], [place] [address]. All rights reserved. This information is not intended as a substitute for professional medical care. Always follow your healthcare professional's instructions.

## 2019-12-20 ENCOUNTER — TELEPHONE (OUTPATIENT)
Dept: URGENT CARE | Facility: CLINIC | Age: 39
End: 2019-12-20

## 2021-04-26 ENCOUNTER — PATIENT MESSAGE (OUTPATIENT)
Dept: RESEARCH | Facility: HOSPITAL | Age: 41
End: 2021-04-26

## 2021-10-26 ENCOUNTER — TELEPHONE (OUTPATIENT)
Dept: SURGERY | Facility: CLINIC | Age: 41
End: 2021-10-26
Payer: COMMERCIAL

## 2021-11-02 ENCOUNTER — TELEPHONE (OUTPATIENT)
Dept: SURGERY | Facility: CLINIC | Age: 41
End: 2021-11-02
Payer: COMMERCIAL

## 2021-11-02 ENCOUNTER — OFFICE VISIT (OUTPATIENT)
Dept: UROLOGY | Facility: CLINIC | Age: 41
End: 2021-11-02
Payer: COMMERCIAL

## 2021-11-02 DIAGNOSIS — N32.89 BLADDER MASS: Primary | ICD-10-CM

## 2021-11-02 PROCEDURE — 99499 NO LOS: ICD-10-PCS | Mod: S$GLB,,, | Performed by: NURSE PRACTITIONER

## 2021-11-02 PROCEDURE — 99499 UNLISTED E&M SERVICE: CPT | Mod: S$GLB,,, | Performed by: NURSE PRACTITIONER

## 2021-11-03 ENCOUNTER — OFFICE VISIT (OUTPATIENT)
Dept: SURGERY | Facility: CLINIC | Age: 41
End: 2021-11-03
Payer: COMMERCIAL

## 2021-11-03 ENCOUNTER — OFFICE VISIT (OUTPATIENT)
Dept: UROLOGY | Facility: CLINIC | Age: 41
End: 2021-11-03
Payer: COMMERCIAL

## 2021-11-03 VITALS
HEART RATE: 69 BPM | WEIGHT: 188.94 LBS | BODY MASS INDEX: 24.93 KG/M2 | DIASTOLIC BLOOD PRESSURE: 79 MMHG | SYSTOLIC BLOOD PRESSURE: 132 MMHG

## 2021-11-03 DIAGNOSIS — R19.7 DIARRHEA, UNSPECIFIED TYPE: Primary | ICD-10-CM

## 2021-11-03 DIAGNOSIS — N32.89 BLADDER MASS: ICD-10-CM

## 2021-11-03 DIAGNOSIS — N32.89 BLADDER MASS: Primary | ICD-10-CM

## 2021-11-03 DIAGNOSIS — K57.92 DIVERTICULITIS: ICD-10-CM

## 2021-11-03 LAB
AMORPH CRY URNS QL MICRO: NORMAL
BACTERIA #/AREA URNS HPF: NORMAL /HPF
MICROSCOPIC COMMENT: NORMAL
SQUAMOUS #/AREA URNS HPF: 1 /HPF

## 2021-11-03 PROCEDURE — 3078F DIAST BP <80 MM HG: CPT | Mod: CPTII,S$GLB,, | Performed by: STUDENT IN AN ORGANIZED HEALTH CARE EDUCATION/TRAINING PROGRAM

## 2021-11-03 PROCEDURE — 3008F BODY MASS INDEX DOCD: CPT | Mod: CPTII,S$GLB,, | Performed by: STUDENT IN AN ORGANIZED HEALTH CARE EDUCATION/TRAINING PROGRAM

## 2021-11-03 PROCEDURE — 99999 PR PBB SHADOW E&M-EST. PATIENT-LVL II: ICD-10-PCS | Mod: PBBFAC,,, | Performed by: STUDENT IN AN ORGANIZED HEALTH CARE EDUCATION/TRAINING PROGRAM

## 2021-11-03 PROCEDURE — 99204 OFFICE O/P NEW MOD 45 MIN: CPT | Mod: 25,S$GLB,, | Performed by: STUDENT IN AN ORGANIZED HEALTH CARE EDUCATION/TRAINING PROGRAM

## 2021-11-03 PROCEDURE — 1159F MED LIST DOCD IN RCRD: CPT | Mod: CPTII,S$GLB,, | Performed by: NURSE PRACTITIONER

## 2021-11-03 PROCEDURE — 46600 PR DIAG2STIC A2SCOPY: ICD-10-PCS | Mod: S$GLB,,, | Performed by: STUDENT IN AN ORGANIZED HEALTH CARE EDUCATION/TRAINING PROGRAM

## 2021-11-03 PROCEDURE — 1160F RVW MEDS BY RX/DR IN RCRD: CPT | Mod: CPTII,S$GLB,, | Performed by: NURSE PRACTITIONER

## 2021-11-03 PROCEDURE — 99999 PR PBB SHADOW E&M-EST. PATIENT-LVL II: ICD-10-PCS | Mod: PBBFAC,,, | Performed by: NURSE PRACTITIONER

## 2021-11-03 PROCEDURE — 99999 PR PBB SHADOW E&M-EST. PATIENT-LVL II: CPT | Mod: PBBFAC,,, | Performed by: NURSE PRACTITIONER

## 2021-11-03 PROCEDURE — 46600 DIAGNOSTIC ANOSCOPY SPX: CPT | Mod: S$GLB,,, | Performed by: STUDENT IN AN ORGANIZED HEALTH CARE EDUCATION/TRAINING PROGRAM

## 2021-11-03 PROCEDURE — 1160F PR REVIEW ALL MEDS BY PRESCRIBER/CLIN PHARMACIST DOCUMENTED: ICD-10-PCS | Mod: CPTII,S$GLB,, | Performed by: NURSE PRACTITIONER

## 2021-11-03 PROCEDURE — 99999 PR PBB SHADOW E&M-EST. PATIENT-LVL II: CPT | Mod: PBBFAC,,, | Performed by: STUDENT IN AN ORGANIZED HEALTH CARE EDUCATION/TRAINING PROGRAM

## 2021-11-03 PROCEDURE — 3075F PR MOST RECENT SYSTOLIC BLOOD PRESS GE 130-139MM HG: ICD-10-PCS | Mod: CPTII,S$GLB,, | Performed by: STUDENT IN AN ORGANIZED HEALTH CARE EDUCATION/TRAINING PROGRAM

## 2021-11-03 PROCEDURE — 3075F SYST BP GE 130 - 139MM HG: CPT | Mod: CPTII,S$GLB,, | Performed by: STUDENT IN AN ORGANIZED HEALTH CARE EDUCATION/TRAINING PROGRAM

## 2021-11-03 PROCEDURE — 81001 URINALYSIS AUTO W/SCOPE: CPT | Performed by: NURSE PRACTITIONER

## 2021-11-03 PROCEDURE — 1159F PR MEDICATION LIST DOCUMENTED IN MEDICAL RECORD: ICD-10-PCS | Mod: CPTII,S$GLB,, | Performed by: NURSE PRACTITIONER

## 2021-11-03 PROCEDURE — 99204 PR OFFICE/OUTPT VISIT, NEW, LEVL IV, 45-59 MIN: ICD-10-PCS | Mod: 25,S$GLB,, | Performed by: STUDENT IN AN ORGANIZED HEALTH CARE EDUCATION/TRAINING PROGRAM

## 2021-11-03 PROCEDURE — 3008F PR BODY MASS INDEX (BMI) DOCUMENTED: ICD-10-PCS | Mod: CPTII,S$GLB,, | Performed by: STUDENT IN AN ORGANIZED HEALTH CARE EDUCATION/TRAINING PROGRAM

## 2021-11-03 PROCEDURE — 99202 OFFICE O/P NEW SF 15 MIN: CPT | Mod: S$GLB,,, | Performed by: NURSE PRACTITIONER

## 2021-11-03 PROCEDURE — 99202 PR OFFICE/OUTPT VISIT, NEW, LEVL II, 15-29 MIN: ICD-10-PCS | Mod: S$GLB,,, | Performed by: NURSE PRACTITIONER

## 2021-11-03 PROCEDURE — 3078F PR MOST RECENT DIASTOLIC BLOOD PRESSURE < 80 MM HG: ICD-10-PCS | Mod: CPTII,S$GLB,, | Performed by: STUDENT IN AN ORGANIZED HEALTH CARE EDUCATION/TRAINING PROGRAM

## 2021-11-04 ENCOUNTER — TELEPHONE (OUTPATIENT)
Dept: SURGERY | Facility: CLINIC | Age: 41
End: 2021-11-04
Payer: COMMERCIAL

## 2021-11-05 ENCOUNTER — TELEPHONE (OUTPATIENT)
Dept: SURGERY | Facility: CLINIC | Age: 41
End: 2021-11-05
Payer: COMMERCIAL

## 2021-11-08 ENCOUNTER — TELEPHONE (OUTPATIENT)
Dept: SURGERY | Facility: CLINIC | Age: 41
End: 2021-11-08
Payer: COMMERCIAL

## 2021-11-08 ENCOUNTER — PATIENT MESSAGE (OUTPATIENT)
Dept: SURGERY | Facility: CLINIC | Age: 41
End: 2021-11-08
Payer: COMMERCIAL

## 2021-11-09 ENCOUNTER — TELEPHONE (OUTPATIENT)
Dept: SURGERY | Facility: CLINIC | Age: 41
End: 2021-11-09
Payer: COMMERCIAL

## 2021-11-09 ENCOUNTER — OFFICE VISIT (OUTPATIENT)
Dept: UROLOGY | Facility: CLINIC | Age: 41
End: 2021-11-09
Payer: COMMERCIAL

## 2021-11-09 VITALS
SYSTOLIC BLOOD PRESSURE: 146 MMHG | HEIGHT: 73 IN | BODY MASS INDEX: 25.04 KG/M2 | DIASTOLIC BLOOD PRESSURE: 91 MMHG | WEIGHT: 188.94 LBS | HEART RATE: 74 BPM

## 2021-11-09 DIAGNOSIS — N32.89 BLADDER MASS: Primary | ICD-10-CM

## 2021-11-09 DIAGNOSIS — R19.7 DIARRHEA, UNSPECIFIED TYPE: Primary | ICD-10-CM

## 2021-11-09 DIAGNOSIS — R19.7 DIARRHEA, UNSPECIFIED TYPE: ICD-10-CM

## 2021-11-09 PROCEDURE — 3077F SYST BP >= 140 MM HG: CPT | Mod: CPTII,S$GLB,, | Performed by: NURSE PRACTITIONER

## 2021-11-09 PROCEDURE — 1160F RVW MEDS BY RX/DR IN RCRD: CPT | Mod: CPTII,S$GLB,, | Performed by: NURSE PRACTITIONER

## 2021-11-09 PROCEDURE — 87324 CLOSTRIDIUM AG IA: CPT | Performed by: STUDENT IN AN ORGANIZED HEALTH CARE EDUCATION/TRAINING PROGRAM

## 2021-11-09 PROCEDURE — 1159F MED LIST DOCD IN RCRD: CPT | Mod: CPTII,S$GLB,, | Performed by: NURSE PRACTITIONER

## 2021-11-09 PROCEDURE — 99213 OFFICE O/P EST LOW 20 MIN: CPT | Mod: S$GLB,,, | Performed by: NURSE PRACTITIONER

## 2021-11-09 PROCEDURE — 3080F PR MOST RECENT DIASTOLIC BLOOD PRESSURE >= 90 MM HG: ICD-10-PCS | Mod: CPTII,S$GLB,, | Performed by: NURSE PRACTITIONER

## 2021-11-09 PROCEDURE — 99999 PR PBB SHADOW E&M-EST. PATIENT-LVL IV: CPT | Mod: PBBFAC,,, | Performed by: NURSE PRACTITIONER

## 2021-11-09 PROCEDURE — 99213 PR OFFICE/OUTPT VISIT, EST, LEVL III, 20-29 MIN: ICD-10-PCS | Mod: S$GLB,,, | Performed by: NURSE PRACTITIONER

## 2021-11-09 PROCEDURE — 99999 PR PBB SHADOW E&M-EST. PATIENT-LVL IV: ICD-10-PCS | Mod: PBBFAC,,, | Performed by: NURSE PRACTITIONER

## 2021-11-09 PROCEDURE — 3008F PR BODY MASS INDEX (BMI) DOCUMENTED: ICD-10-PCS | Mod: CPTII,S$GLB,, | Performed by: NURSE PRACTITIONER

## 2021-11-09 PROCEDURE — 87449 NOS EACH ORGANISM AG IA: CPT | Performed by: STUDENT IN AN ORGANIZED HEALTH CARE EDUCATION/TRAINING PROGRAM

## 2021-11-09 PROCEDURE — 3077F PR MOST RECENT SYSTOLIC BLOOD PRESSURE >= 140 MM HG: ICD-10-PCS | Mod: CPTII,S$GLB,, | Performed by: NURSE PRACTITIONER

## 2021-11-09 PROCEDURE — 1159F PR MEDICATION LIST DOCUMENTED IN MEDICAL RECORD: ICD-10-PCS | Mod: CPTII,S$GLB,, | Performed by: NURSE PRACTITIONER

## 2021-11-09 PROCEDURE — 3008F BODY MASS INDEX DOCD: CPT | Mod: CPTII,S$GLB,, | Performed by: NURSE PRACTITIONER

## 2021-11-09 PROCEDURE — 1160F PR REVIEW ALL MEDS BY PRESCRIBER/CLIN PHARMACIST DOCUMENTED: ICD-10-PCS | Mod: CPTII,S$GLB,, | Performed by: NURSE PRACTITIONER

## 2021-11-09 PROCEDURE — 3080F DIAST BP >= 90 MM HG: CPT | Mod: CPTII,S$GLB,, | Performed by: NURSE PRACTITIONER

## 2021-11-09 PROCEDURE — 88112 PR  CYTOPATH, CELL ENHANCE TECH: ICD-10-PCS | Mod: 26,,, | Performed by: PATHOLOGY

## 2021-11-09 PROCEDURE — 88112 CYTOPATH CELL ENHANCE TECH: CPT | Mod: 26,,, | Performed by: PATHOLOGY

## 2021-11-10 ENCOUNTER — TELEPHONE (OUTPATIENT)
Dept: SURGERY | Facility: CLINIC | Age: 41
End: 2021-11-10
Payer: COMMERCIAL

## 2021-11-10 ENCOUNTER — PATIENT MESSAGE (OUTPATIENT)
Dept: SURGERY | Facility: CLINIC | Age: 41
End: 2021-11-10
Payer: COMMERCIAL

## 2021-11-10 LAB
C DIFF GDH STL QL: NEGATIVE
C DIFF TOX A+B STL QL IA: NEGATIVE

## 2021-11-11 ENCOUNTER — TELEPHONE (OUTPATIENT)
Dept: SURGERY | Facility: CLINIC | Age: 41
End: 2021-11-11
Payer: COMMERCIAL

## 2021-11-12 LAB
FINAL PATHOLOGIC DIAGNOSIS: NORMAL
Lab: NORMAL

## 2021-11-16 ENCOUNTER — PATIENT MESSAGE (OUTPATIENT)
Dept: UROLOGY | Facility: CLINIC | Age: 41
End: 2021-11-16
Payer: COMMERCIAL

## 2021-11-16 ENCOUNTER — TELEPHONE (OUTPATIENT)
Dept: SURGERY | Facility: CLINIC | Age: 41
End: 2021-11-16
Payer: COMMERCIAL

## 2021-11-23 ENCOUNTER — TELEPHONE (OUTPATIENT)
Dept: GASTROENTEROLOGY | Facility: CLINIC | Age: 41
End: 2021-11-23
Payer: COMMERCIAL

## 2021-11-23 DIAGNOSIS — R19.7 DIARRHEA, UNSPECIFIED TYPE: Primary | ICD-10-CM

## 2021-12-02 ENCOUNTER — TELEPHONE (OUTPATIENT)
Dept: SURGERY | Facility: CLINIC | Age: 41
End: 2021-12-02
Payer: COMMERCIAL

## 2021-12-08 ENCOUNTER — PATIENT MESSAGE (OUTPATIENT)
Dept: SURGERY | Facility: CLINIC | Age: 41
End: 2021-12-08
Payer: COMMERCIAL

## 2021-12-13 ENCOUNTER — TELEPHONE (OUTPATIENT)
Dept: SURGERY | Facility: CLINIC | Age: 41
End: 2021-12-13

## 2021-12-14 ENCOUNTER — PATIENT MESSAGE (OUTPATIENT)
Dept: SURGERY | Facility: CLINIC | Age: 41
End: 2021-12-14

## 2021-12-14 ENCOUNTER — OFFICE VISIT (OUTPATIENT)
Dept: SURGERY | Facility: CLINIC | Age: 41
End: 2021-12-14
Payer: COMMERCIAL

## 2021-12-14 DIAGNOSIS — R19.7 DIARRHEA, UNSPECIFIED TYPE: Primary | ICD-10-CM

## 2021-12-14 PROCEDURE — 99213 PR OFFICE/OUTPT VISIT, EST, LEVL III, 20-29 MIN: ICD-10-PCS | Mod: 95,,, | Performed by: STUDENT IN AN ORGANIZED HEALTH CARE EDUCATION/TRAINING PROGRAM

## 2021-12-14 PROCEDURE — 99213 OFFICE O/P EST LOW 20 MIN: CPT | Mod: 95,,, | Performed by: STUDENT IN AN ORGANIZED HEALTH CARE EDUCATION/TRAINING PROGRAM

## 2021-12-15 ENCOUNTER — OFFICE VISIT (OUTPATIENT)
Dept: GASTROENTEROLOGY | Facility: CLINIC | Age: 41
End: 2021-12-15
Payer: COMMERCIAL

## 2021-12-15 VITALS
DIASTOLIC BLOOD PRESSURE: 80 MMHG | HEART RATE: 78 BPM | WEIGHT: 187.38 LBS | SYSTOLIC BLOOD PRESSURE: 141 MMHG | BODY MASS INDEX: 24.83 KG/M2 | HEIGHT: 73 IN

## 2021-12-15 DIAGNOSIS — R19.7 DIARRHEA, UNSPECIFIED TYPE: Primary | ICD-10-CM

## 2021-12-15 DIAGNOSIS — K57.90 DIVERTICULAR DISEASE: ICD-10-CM

## 2021-12-15 PROCEDURE — 99204 PR OFFICE/OUTPT VISIT, NEW, LEVL IV, 45-59 MIN: ICD-10-PCS | Mod: S$GLB,,, | Performed by: STUDENT IN AN ORGANIZED HEALTH CARE EDUCATION/TRAINING PROGRAM

## 2021-12-15 PROCEDURE — 99999 PR PBB SHADOW E&M-EST. PATIENT-LVL III: CPT | Mod: PBBFAC,,, | Performed by: STUDENT IN AN ORGANIZED HEALTH CARE EDUCATION/TRAINING PROGRAM

## 2021-12-15 PROCEDURE — 99204 OFFICE O/P NEW MOD 45 MIN: CPT | Mod: S$GLB,,, | Performed by: STUDENT IN AN ORGANIZED HEALTH CARE EDUCATION/TRAINING PROGRAM

## 2021-12-15 PROCEDURE — 99999 PR PBB SHADOW E&M-EST. PATIENT-LVL III: ICD-10-PCS | Mod: PBBFAC,,, | Performed by: STUDENT IN AN ORGANIZED HEALTH CARE EDUCATION/TRAINING PROGRAM

## 2021-12-22 ENCOUNTER — TELEPHONE (OUTPATIENT)
Dept: UROLOGY | Facility: CLINIC | Age: 41
End: 2021-12-22

## 2021-12-22 DIAGNOSIS — Z12.11 SPECIAL SCREENING FOR MALIGNANT NEOPLASMS, COLON: Primary | ICD-10-CM

## 2021-12-22 RX ORDER — POLYETHYLENE GLYCOL 3350, SODIUM SULFATE ANHYDROUS, SODIUM BICARBONATE, SODIUM CHLORIDE, POTASSIUM CHLORIDE 236; 22.74; 6.74; 5.86; 2.97 G/4L; G/4L; G/4L; G/4L; G/4L
4 POWDER, FOR SOLUTION ORAL ONCE
Qty: 4000 ML | Refills: 0 | Status: SHIPPED | OUTPATIENT
Start: 2021-12-22 | End: 2021-12-22

## 2022-01-05 ENCOUNTER — TELEPHONE (OUTPATIENT)
Dept: ENDOSCOPY | Facility: HOSPITAL | Age: 42
End: 2022-01-05
Payer: COMMERCIAL

## 2022-01-05 NOTE — TELEPHONE ENCOUNTER
----- Message from Peter Marie sent at 1/5/2022 11:15 AM CST -----  Regarding: appt  Contact: pt  Pt requesting a call back in regards to upcoming appt have some questions and concerns.        Pt @[phone]

## 2022-01-10 ENCOUNTER — PROCEDURE VISIT (OUTPATIENT)
Dept: UROLOGY | Facility: CLINIC | Age: 42
End: 2022-01-10
Attending: UROLOGY
Payer: COMMERCIAL

## 2022-01-10 ENCOUNTER — TELEPHONE (OUTPATIENT)
Dept: UROLOGY | Facility: CLINIC | Age: 42
End: 2022-01-10
Payer: COMMERCIAL

## 2022-01-10 VITALS
DIASTOLIC BLOOD PRESSURE: 80 MMHG | BODY MASS INDEX: 25.99 KG/M2 | HEART RATE: 75 BPM | WEIGHT: 196.13 LBS | SYSTOLIC BLOOD PRESSURE: 134 MMHG | HEIGHT: 73 IN

## 2022-01-10 DIAGNOSIS — N32.89 BLADDER MASS: ICD-10-CM

## 2022-01-10 LAB
BILIRUB SERPL-MCNC: NEGATIVE MG/DL
BLOOD URINE, POC: POSITIVE
CLARITY, POC UA: CLEAR
COLOR, POC UA: YELLOW
GLUCOSE UR QL STRIP: NEGATIVE
KETONES UR QL STRIP: NEGATIVE
LEUKOCYTE ESTERASE URINE, POC: NEGATIVE
NITRITE, POC UA: NEGATIVE
PH, POC UA: 6
PROTEIN, POC: POSITIVE
SPECIFIC GRAVITY, POC UA: 1.02
UROBILINOGEN, POC UA: NEGATIVE

## 2022-01-10 PROCEDURE — 81002 POCT URINE DIPSTICK WITHOUT MICROSCOPE: ICD-10-PCS | Mod: S$GLB,,, | Performed by: UROLOGY

## 2022-01-10 PROCEDURE — 81002 URINALYSIS NONAUTO W/O SCOPE: CPT | Mod: S$GLB,,, | Performed by: UROLOGY

## 2022-01-10 PROCEDURE — 52000 CYSTOURETHROSCOPY: CPT | Mod: S$GLB,,, | Performed by: UROLOGY

## 2022-01-10 PROCEDURE — 52000 CYSTOSCOPY: ICD-10-PCS | Mod: S$GLB,,, | Performed by: UROLOGY

## 2022-01-10 RX ORDER — CIPROFLOXACIN 500 MG/1
500 TABLET ORAL
Status: COMPLETED | OUTPATIENT
Start: 2022-01-10 | End: 2022-01-10

## 2022-01-10 RX ORDER — LIDOCAINE HYDROCHLORIDE 20 MG/ML
JELLY TOPICAL ONCE
Status: COMPLETED | OUTPATIENT
Start: 2022-01-10 | End: 2022-01-10

## 2022-01-10 RX ADMIN — LIDOCAINE HYDROCHLORIDE: 20 JELLY TOPICAL at 05:01

## 2022-01-10 RX ADMIN — CIPROFLOXACIN 500 MG: 500 TABLET ORAL at 05:01

## 2022-01-10 NOTE — TELEPHONE ENCOUNTER
----- Message from Jerson Osborn sent at 1/10/2022  7:41 AM CST -----  Name of Who is Calling: MANISHA CHAPPELL          What is the request in detail: The patient is calling to find out is there anything he needs to do before his procedure. Please advise          Can the clinic reply by [facility]: Yes         [facility]: [phone]

## 2022-01-10 NOTE — PROCEDURES
Cystoscopy    Date/Time: 1/10/2022 2:45 PM  Performed by: Manuel Love MD  Authorized by: Mar Batista NP     Consent Done?:  Yes (Written)  Indications comment:  Possible bladder mass on CT  Position:  Supine  Anesthesia:  Lidocaine jelly  Preparation: Patient was prepped and draped in usual sterile fashion      Scope type:  Flexible cystoscope  External exam normal: Yes    Urethra normal: Yes    Prostate normal: Yes  Bladder neck normal: Bladder neck normal   Bladder normal: No (Submucosal cystic structure near bladder dome c/w urachal cyst. No tumors, lesions, or stones noted.  Normal bilateral UOs.)      Patient tolerance:  Patient tolerated the procedure well with no immediate complications    Impression  Urachal cyst    Plan  FU PRN

## 2022-02-08 ENCOUNTER — TELEPHONE (OUTPATIENT)
Dept: SURGERY | Facility: CLINIC | Age: 42
End: 2022-02-08
Payer: COMMERCIAL

## 2022-02-08 NOTE — TELEPHONE ENCOUNTER
Left message about cancelling colonoscopy on 2/17 and number of endoscopy scheduling nurse for the patient.

## 2022-06-09 ENCOUNTER — OFFICE VISIT (OUTPATIENT)
Dept: URGENT CARE | Facility: CLINIC | Age: 42
End: 2022-06-09
Payer: COMMERCIAL

## 2022-06-09 VITALS
BODY MASS INDEX: 25.98 KG/M2 | WEIGHT: 196 LBS | SYSTOLIC BLOOD PRESSURE: 137 MMHG | HEART RATE: 76 BPM | RESPIRATION RATE: 16 BRPM | HEIGHT: 73 IN | DIASTOLIC BLOOD PRESSURE: 80 MMHG | TEMPERATURE: 98 F | OXYGEN SATURATION: 97 %

## 2022-06-09 DIAGNOSIS — L73.9 FOLLICULITIS OF LEFT AXILLA: ICD-10-CM

## 2022-06-09 DIAGNOSIS — N61.0 MASTITIS: Primary | ICD-10-CM

## 2022-06-09 PROCEDURE — 3008F BODY MASS INDEX DOCD: CPT | Mod: CPTII,S$GLB,, | Performed by: PHYSICIAN ASSISTANT

## 2022-06-09 PROCEDURE — 3075F SYST BP GE 130 - 139MM HG: CPT | Mod: CPTII,S$GLB,, | Performed by: PHYSICIAN ASSISTANT

## 2022-06-09 PROCEDURE — 3075F PR MOST RECENT SYSTOLIC BLOOD PRESS GE 130-139MM HG: ICD-10-PCS | Mod: CPTII,S$GLB,, | Performed by: PHYSICIAN ASSISTANT

## 2022-06-09 PROCEDURE — 1159F MED LIST DOCD IN RCRD: CPT | Mod: CPTII,S$GLB,, | Performed by: PHYSICIAN ASSISTANT

## 2022-06-09 PROCEDURE — 99214 PR OFFICE/OUTPT VISIT, EST, LEVL IV, 30-39 MIN: ICD-10-PCS | Mod: S$GLB,,, | Performed by: PHYSICIAN ASSISTANT

## 2022-06-09 PROCEDURE — 1160F PR REVIEW ALL MEDS BY PRESCRIBER/CLIN PHARMACIST DOCUMENTED: ICD-10-PCS | Mod: CPTII,S$GLB,, | Performed by: PHYSICIAN ASSISTANT

## 2022-06-09 PROCEDURE — 1159F PR MEDICATION LIST DOCUMENTED IN MEDICAL RECORD: ICD-10-PCS | Mod: CPTII,S$GLB,, | Performed by: PHYSICIAN ASSISTANT

## 2022-06-09 PROCEDURE — 1160F RVW MEDS BY RX/DR IN RCRD: CPT | Mod: CPTII,S$GLB,, | Performed by: PHYSICIAN ASSISTANT

## 2022-06-09 PROCEDURE — 3079F DIAST BP 80-89 MM HG: CPT | Mod: CPTII,S$GLB,, | Performed by: PHYSICIAN ASSISTANT

## 2022-06-09 PROCEDURE — 3079F PR MOST RECENT DIASTOLIC BLOOD PRESSURE 80-89 MM HG: ICD-10-PCS | Mod: CPTII,S$GLB,, | Performed by: PHYSICIAN ASSISTANT

## 2022-06-09 PROCEDURE — 3008F PR BODY MASS INDEX (BMI) DOCUMENTED: ICD-10-PCS | Mod: CPTII,S$GLB,, | Performed by: PHYSICIAN ASSISTANT

## 2022-06-09 PROCEDURE — 99214 OFFICE O/P EST MOD 30 MIN: CPT | Mod: S$GLB,,, | Performed by: PHYSICIAN ASSISTANT

## 2022-06-09 RX ORDER — SULFAMETHOXAZOLE AND TRIMETHOPRIM 800; 160 MG/1; MG/1
1 TABLET ORAL 2 TIMES DAILY
Qty: 14 TABLET | Refills: 0 | Status: SHIPPED | OUTPATIENT
Start: 2022-06-09 | End: 2022-06-16

## 2022-06-09 NOTE — PROGRESS NOTES
"Subjective:       Patient ID: Michael Zuñiga is a 41 y.o. male.    Vitals:  height is 6' 1" (1.854 m) and weight is 88.9 kg (196 lb). His temperature is 97.8 °F (36.6 °C). His blood pressure is 137/80 and his pulse is 76. His respiration is 16 and oxygen saturation is 97%.     Chief Complaint: Breast Problem    Patient presents with complaint of swelling, redness, pain, tenderness surrounding left nipple that began 2 days ago. Has improved today, though . No discharge. No injury or trauma. Pt states it started as tenderness to nipple area, then swelling and redness. There was no bite, lesion, or bump in the area prior to onset. Pt reports he was camping in [place] this past weekend.  A friend he was camping with also has the same issue. Pt did not see anything bite him. There has been no itching. He searched for ticks and has not had any ticks. No fever or systemic symptoms.  Reports being in creek water and hot tub while he was there. Not on hormonal therapy. States that nipple was not in contact with a mouth or bitten recently.     Other  This is a new problem. The current episode started in the past 7 days. Pertinent negatives include no abdominal pain, chest pain, chills, congestion, coughing, diaphoresis, fatigue, fever, headaches, nausea, neck pain, numbness, rash, sore throat or vomiting. Nothing aggravates the symptoms. He has tried NSAIDs (ice) for the symptoms. The treatment provided no relief.       Constitution: Negative for chills, sweating, fatigue and fever.   HENT: Negative for ear pain, congestion and sore throat.    Neck: Negative for neck pain and neck stiffness.   Cardiovascular: Negative for chest pain, leg swelling and palpitations.   Eyes: Negative for eye discharge, eye itching, eye pain and eye redness.   Respiratory: Negative for cough, sputum production and shortness of breath.    Gastrointestinal: Negative for abdominal pain, nausea, vomiting and diarrhea.   Genitourinary: " Negative for dysuria, frequency and urine decreased.   Musculoskeletal: Positive for pain. Negative for trauma, gout and pain with walking.   Skin: Positive for color change and erythema. Negative for rash.   Neurological: Negative for dizziness, light-headedness, headaches and numbness.          Objective:      Physical Exam   Constitutional: He is oriented to person, place, and time. He appears well-developed.  Non-toxic appearance. He does not appear ill. No distress.      Comments:Three small erythematous papules of left axilla, mildly tender. No lymphadenopathy. No rash on rest of body.      HENT:   Head: Normocephalic and atraumatic. Head is without abrasion, without contusion and without laceration.   Ears:   Right Ear: External ear normal.   Left Ear: External ear normal.   Nose: Nose normal.   Mouth/Throat: Oropharynx is clear and moist and mucous membranes are normal.   Eyes: Conjunctivae, EOM and lids are normal. Pupils are equal, round, and reactive to light.   Neck: Trachea normal and phonation normal. Neck supple.   Cardiovascular: Normal rate, regular rhythm and normal heart sounds.   Pulmonary/Chest: Effort normal and breath sounds normal. No accessory muscle usage or stridor. No tachypnea and no bradypnea. No respiratory distress. He has no decreased breath sounds. He has no wheezes.       Musculoskeletal: Normal range of motion.         General: Normal range of motion.   Neurological: He is alert and oriented to person, place, and time.   Skin: Skin is warm, dry, intact, not diaphoretic and no rash. Capillary refill takes less than 2 seconds. erythema No abrasion, No burn, No bruising and No ecchymosis   Psychiatric: His speech is normal and behavior is normal. Judgment and thought content normal.   Nursing note and vitals reviewed.                Assessment:       1. Mastitis    2. Folliculitis of left axilla          Plan:       This appears infectious, cellulitis/mastitis as likely ddx. Not a  fluctuant palpable abscess at this time. Have also considered early shingles, contact dermatitis, superficial phlebitis, vasculitis, among others in ddx, though less likely. Patient does have a few papules in left axilla, no vesicles, and no other rash or papules. Have considered hot tub folliculitis here, though not widespread enough to diagnose at this time, and I do not think added coverage for pseudomonas is warranted at this time. Will begin empiric treatment with bactrim, instructed close monitoring, and given strict follow up precautions for new or worsening symptoms or no improvement. Pt expresses understanding and agrees with plan of care.    Mastitis  -     sulfamethoxazole-trimethoprim 800-160mg (BACTRIM DS) 800-160 mg Tab; Take 1 tablet by mouth 2 (two) times daily. for 7 days  Dispense: 14 tablet; Refill: 0    Folliculitis of left axilla      Patient Instructions   - Rest.    - Drink plenty of fluids.    - Acetaminophen (tylenol) or Ibuprofen (advil,motrin) as directed as needed for fever/pain. Avoid tylenol if you have a history of liver disease. Do not take ibuprofen if you have a history of GI bleeding, kidney disease, or if you take blood thinners.     - Warm compresses to affected area 2-3 times a day for 5-10 minutes at a time can help    - You have been given an antibiotic to treat your condition today.    - Please complete the antibiotic as directed on the bottle.   - If you are female and on oral birth control pills, use additional methods to prevent pregnancy while on antibiotics and for one cycle after.   - you can take otc probiotic to limit upset stomach    - Follow up with your PCP or specialty clinic as directed in the next 1-2 weeks if not improved or as needed.  You can call [phone] to schedule an appointment with the appropriate provider.    - Go to the ER or seek medical attention immediately if you develop new or worsening symptoms.     - You must understand that you have  received an Urgent Care treatment only and that you may be released before all of your medical problems are known or treated.   - You, the patient, will arrange for follow up care as instructed.   - If your condition worsens or fails to improve we recommend that you receive another evaluation at the ER immediately or contact your PCP to discuss your concerns or return here.

## 2022-06-09 NOTE — PATIENT INSTRUCTIONS
- Rest.    - Drink plenty of fluids.    - Acetaminophen (tylenol) or Ibuprofen (advil,motrin) as directed as needed for fever/pain. Avoid tylenol if you have a history of liver disease. Do not take ibuprofen if you have a history of GI bleeding, kidney disease, or if you take blood thinners.     - Warm compresses to affected area 2-3 times a day for 5-10 minutes at a time can help    - You have been given an antibiotic to treat your condition today.    - Please complete the antibiotic as directed on the bottle.   - If you are female and on oral birth control pills, use additional methods to prevent pregnancy while on antibiotics and for one cycle after.   - you can take otc probiotic to limit upset stomach    - Follow up with your PCP or specialty clinic as directed in the next 1-2 weeks if not improved or as needed.  You can call [phone] to schedule an appointment with the appropriate provider.    - Go to the ER or seek medical attention immediately if you develop new or worsening symptoms.     - You must understand that you have received an Urgent Care treatment only and that you may be released before all of your medical problems are known or treated.   - You, the patient, will arrange for follow up care as instructed.   - If your condition worsens or fails to improve we recommend that you receive another evaluation at the ER immediately or contact your PCP to discuss your concerns or return here.

## 2023-05-01 ENCOUNTER — OFFICE VISIT (OUTPATIENT)
Dept: HEPATOLOGY | Facility: CLINIC | Age: 43
End: 2023-05-01
Payer: COMMERCIAL

## 2023-05-01 VITALS — WEIGHT: 194 LBS | BODY MASS INDEX: 25.71 KG/M2 | HEIGHT: 73 IN

## 2023-05-01 DIAGNOSIS — Z83.79 FAMILY HISTORY OF CIRRHOSIS OF LIVER: ICD-10-CM

## 2023-05-01 DIAGNOSIS — Z83.79 FAMILY HISTORY OF LIVER DISEASE: ICD-10-CM

## 2023-05-01 DIAGNOSIS — E78.6: Primary | ICD-10-CM

## 2023-05-01 DIAGNOSIS — Z79.899 ON PRE-EXPOSURE PROPHYLAXIS FOR HIV: ICD-10-CM

## 2023-05-01 PROCEDURE — 1159F MED LIST DOCD IN RCRD: CPT | Mod: CPTII,S$GLB,, | Performed by: PHYSICIAN ASSISTANT

## 2023-05-01 PROCEDURE — 99204 OFFICE O/P NEW MOD 45 MIN: CPT | Mod: S$GLB,,, | Performed by: PHYSICIAN ASSISTANT

## 2023-05-01 PROCEDURE — 99999 PR PBB SHADOW E&M-EST. PATIENT-LVL III: CPT | Mod: PBBFAC,,, | Performed by: PHYSICIAN ASSISTANT

## 2023-05-01 PROCEDURE — 1159F PR MEDICATION LIST DOCUMENTED IN MEDICAL RECORD: ICD-10-PCS | Mod: CPTII,S$GLB,, | Performed by: PHYSICIAN ASSISTANT

## 2023-05-01 PROCEDURE — 1160F PR REVIEW ALL MEDS BY PRESCRIBER/CLIN PHARMACIST DOCUMENTED: ICD-10-PCS | Mod: CPTII,S$GLB,, | Performed by: PHYSICIAN ASSISTANT

## 2023-05-01 PROCEDURE — 99999 PR PBB SHADOW E&M-EST. PATIENT-LVL III: ICD-10-PCS | Mod: PBBFAC,,, | Performed by: PHYSICIAN ASSISTANT

## 2023-05-01 PROCEDURE — 1160F RVW MEDS BY RX/DR IN RCRD: CPT | Mod: CPTII,S$GLB,, | Performed by: PHYSICIAN ASSISTANT

## 2023-05-01 PROCEDURE — 3008F PR BODY MASS INDEX (BMI) DOCUMENTED: ICD-10-PCS | Mod: CPTII,S$GLB,, | Performed by: PHYSICIAN ASSISTANT

## 2023-05-01 PROCEDURE — 99204 PR OFFICE/OUTPT VISIT, NEW, LEVL IV, 45-59 MIN: ICD-10-PCS | Mod: S$GLB,,, | Performed by: PHYSICIAN ASSISTANT

## 2023-05-01 PROCEDURE — 3008F BODY MASS INDEX DOCD: CPT | Mod: CPTII,S$GLB,, | Performed by: PHYSICIAN ASSISTANT

## 2023-05-01 RX ORDER — EMTRICITABINE AND TENOFOVIR DISOPROXIL FUMARATE 200; 300 MG/1; MG/1
1 TABLET, FILM COATED ORAL
COMMUNITY
Start: 2023-02-12

## 2023-05-01 NOTE — PATIENT INSTRUCTIONS
Recommend labs, ultrasound and Fibroscan soon. Please fast for these tests.  Stop alcohol, discontinue smoking. Mediterranean diet fatty liver  Follow-up in 8 weeks with Fibroscan prior.       FATTY LIVER RECOMMENDATIONS:    There is no FDA approved therapy for non-alcoholic fatty liver disease (NAFLD); therefore, lifestyle changes are important:  1. Weight loss goal   2. Mediterranean diet is recommended that focuses on low-carb, low-sugar, and low-processed foods.  3. Exercise, 5 days per week, 30 minutes per day, as tolerated  4. Recommend good control of cholesterol, blood pressure, blood sugar levels (as these are risk factors for fatty liver). Cholesterol lowering medications including statins are typically safe and beneficial (even if liver enzymes are elevated).    5. Limit alcohol consumption      In some people, fatty liver can progress to steatohepatitis (inflammatory fatty liver) and possibly to cirrhosis, putting one at increased risk for liver cancer and liver failure in the future. Lifestyle changes can help to decrease this risk.     If interested in seeing a dietician to create a weight loss plan, contact the dietician team at [facility]: [email or web address].  You can also call to schedule a consult with a dietician: [phone]. *Virtual visits are available with one of our dieticians.     If you have diabetes or high blood pressure, you can meet with a Health  through [facility] [place] [facility] program. Let us know if you are interested in a referral.     Ask about our fatty liver/HOPKINS clinical trials if you have fibrosis / scar tissue related to fatty liver.    *If statins are being considered for HLD, statins are safe in patients with liver disease. Statins can be used to treat dyslipidemia in patients with both NAFLD and HOPKINS.      FATTY LIVER DIET TIPS:    ADD OLIVE OIL. I recommend olive oil daily (in salads or when cooking)  ADD COFFEE. Black coffee has also been found to  be potentially beneficial (skip the sweets and creamer). Add 1-2 cups per day,  if you are able to tolerate. Decaf is fine.  BE MINDFUL OF CARBS AND ADDED SUGARS. Try to limit your carb intake to LESS than 30-45 grams of carbs with a meal or LESS than 5-10 grams with any snack (total of any snack foods eaten during that time).   KEEP A FOOD DIARY. Use MyFitness Pal  OR LOSE IT brook to add up your carbs through the day - the most successful folks on weight loss journey TRACK their progress and food.  LIMIT WHAT YOU ARE DRINKING. Do NOT drink any beverages with calories or carbs (this can lead to high blood sugar and weight gain). Also, some of our patients have been very successful with weight loss using the pre made/planned meal planning services that limit calories and portion size (one example is Sensible Meals but there are many out there). Unsweetened black or green tea is fine! Hibiscus tea is also great and refreshing, especially iced.     Tips for low/moderate carbohydrate diet:  3 meals a day made up of the following:  -- Green vegetables, tomatoes, mushrooms, spaghetti squash, cauliflower, meat, poultry, seafood, eggs   -- Beans (1-1.5 cups) or nuts (1/4 cup): can have 1 x a day.  -- Greek yogurt and fermented foods like kefir, kimchi, saurkraut (be careful if you have swelling issues as lots of salt can worsen swelling or blood pressure)  -- Dressings, seasonings, condiments, etc should have less than 2 g sugars.   -- 1-2 servings of citrus fruits, berries, pineapple or melon a day (1/2 cup)  -- Avoid fried foods  -- Avoid grains, rice, pasta, potatoes, bread, corn, peas, oatmeal, grits, tortillas, crackers, chips  -- No soda, sweet tea, juices or lemonade  -- Use olive/avocado oil rather than vegetable oils or butter.    ADDITIONAL RESOURCES:  Websites with information about fatty liver and inflammation related to fatty liver (HOPKINS): www.nashtruth.TNC and www.nashactually.com   [facility]: Non-Alcoholic  Fatty Liver Disease (NAFLD)    Facebook support group with tips and recipes:   Non-Alcoholic Fatty Liver Disease (NAFLD) Diet & Nutrition Support     Try www.dietdoctor.com for recipes.

## 2023-05-01 NOTE — PROGRESS NOTES
"Hepatology Consultation: [facility]    NEW PATIENT  PCP: Rafaela Olsen NP    Subjective      Mr. Zuñiga is a 42 y.o. male with PMH as listed below who presents to clinic for family history of liver disease.     In relation to metabolic risk factors:   Outside labs suggest ALT 40, low cholesterol.     No results found for: LABA1C, HGBA1C, CHOL, TSH    Body mass index is 25.6 kg/m².    No results found for: ALT, AST, ALKPHOS, BILITOT, ALBUMIN, INR, PLT    Initial history 2023:  Michael Zuñiga arrives today alone.  He is well-appearing and feels well. Denies symptoms of hepatic decompensation including jaundice, ascites, cognitive problems to suggest hepatic encephalopathy, or GI bleeding.    He reports that he was recently told he may have familial low cholesterol. He was concerned as his father had HOPKINS cirrhosis and recently watched him during the end of his life become very sick. Father saw hepatologist in [place] with liver transplant program but is now , sadly. Mr Zuñiga reports this was distressing and he was not sleeping well for past couple of years, but this has improved recently.    Patient states his sister in law saw his low cholesterol and recommended that he see a liver specialist. There was some talk of a hepatologist in [place] "enrolling him in a HOPKINS study." Patient denies prior fibrosis staging.     For the past couple of months, he has stopped drinking, smoking, and illicit substances. He reports no prior mention of liver disease. He reports no prior hepatitis.     He occasionally will use PrEP if needed with new partners but hasn't taken this in a while.     His primary care is at [facility].    He does report occasionally  super fatigued around 3-4pm. 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     In regards to lifestyle, he exercises, he boxes once per week. Cardio and weight lifting. Diet has changed recently as he is more concerned " "with his health      New rx: denies  ETOH: stopped drinking 2-3 months ago   Smoking: cigarettes   Illicit substances: prior co [facility]  Social: lives in [facility]  Occupation: owns [employer] shop  Prior liver biopsy: denies   Prior liver disease: denies   Hepatitis vaccination: unknown      [facility] Michael has a past medical history of Chronic diarrhea, Diverticulitis, and Strep pharyngitis.   [facility] Michael has no past surgical history on file.    Michael's family history includes Cirrhosis in his father; Liver disease in his father; No Known Problems in his mother.   [facility] Michael reports that he has been smoking cigarettes. He has been smoking an average of .25 packs per day. He has never used smokeless tobacco. He reports current alcohol use of about 2.0 standard drinks per week. He reports that he does not use drugs.   SALOMÓN Rodriguez has No Known Allergies.   QUINCY Rodriguez has a current medication list which includes the following prescription(s): alprazolam, cyclobenzaprine, emtricitabine-tenofovir 200-300 mg, ibuprofen, ketoconazole, methocarbamol, and varenicline.       ROS:   GENERAL: (+) afternoon fatigue, helped with better sleep hygeine  HEENT: Denies yellowing of eyes   CARDIOVASCULAR: Denies edema  GI: Denies abdominal pain  SKIN: Denies rash, itching       Objective     Ht 6' 1" (1.854 m)   Wt 88 kg (194 lb 0.1 oz)   BMI 25.60 kg/m²     PHYSICAL EXAM:   Friendly man, in no acute distress; alert and oriented to person, place and time  EYES: Sclerae anicteric  GI: Soft, non-tender, non-distended. No ascites.  EXTREMITIES:  No edema.  SKIN: Warm and dry. No jaundice. No telangectasias noted. No palmar erythema.  NEURO:  Normal gait. No asterixis.  PSYCH:  Memory intact. Thought and speech pattern appropriate. Behavior normal    DIAGNOSTICS:  No results found for: ALT, AST, ALKPHOS, BILITOT, ALBUMIN, INR, PLT  No results found for: AFP    Prior serologic workup:   No results found for: SMOOTHMUSCAB, " AMAIFA, IGGSERUM, ANASCREEN, FERRITIN, FESATURATED, PETH, [id number], CERULOPLSM, HEPBSAG, HEPCAB, HEPAIGM    Imaging:  CT Urogram Abd Pelvis W WO  Narrative: EXAMINATION:  CT UROGRAM ABD PELVIS W WO    CLINICAL HISTORY:  possible bladder mass; Other specified disorders of bladder    TECHNIQUE:  Low dose axial, sagittal and coronal reformations were obtained from the lung bases to the pubic symphysis before and following the IV administration of 100 mL of Omnipaque 350.  Timing was optimized for nephrogram and excretory renal phases.    COMPARISON:  None    FINDINGS:  Heart: No cardiomegaly or pericardial effusion.    Lung Bases: Symmetrically expanded and clear.    Liver: Normal in size and attenuation without focal hepatic abnormality.    Gallbladder: Normal appearance without evidence for cholecystitis.    Bile Ducts: No intra or extrahepatic biliary ductal dilation.    Pancreas: No pancreatic mass lesion or peripancreatic inflammatory change.    Spleen: Normal.    Adrenals: Normal.    Genitourinary: Normal in size and location.  No focal renal abnormality, nephrolithiasis, or hydroureteronephrosis.  Nonspecific focal soft tissue thickening of the midline luminal side of the urinary bladder measuring approximately 1.1 by 0.7 cm.  This demonstrates attenuation similar to the urinary bladder wall in this may represent focal wall thickening, however a nonenhancing bladder mass is not entirely excluded.    Reproductive organs: Normal.    GI tract/Mesentery: Stomach is normal appearance.  No dilated loops of small bowel.  No evidence of obstruction.  Diffuse abnormal thickening of the sigmoid colon with no definite adjacent fat stranding.  There are a few diverticula noted.    Peritoneal Space: No abdominopelvic ascites or intraperitoneal free air.    Retroperitoneum: No significant adenopathy.    Abdominal wall: Normal.    Vasculature: Abdominal aorta is normal in caliber without significant calcific  atherosclerosis.    Bones: Normal appearance without acute fracture or bony destructive process.  Impression: 1.1 cm focus of soft tissue thickening along the anterior superior aspect of the luminal side of the urinary bladder.  This may represent focal soft tissue thickening of the urinary bladder wall versus an underlying mass considered less likely.  Correlation with cystoscopy is recommended.    Diffuse bowel wall thickening of the sigmoid colon with a few adjacent diverticula.  No definite adjacent fat stranding.  Findings may be seen with diverticulitis, however clinical correlation is recommended and consider colonoscopy after treatment to exclude underlying mass.    This report was flagged in Epic as abnormal.    Electronically signed by: Marvin Good MD  Date:    2021  Time:    15:23      Assessment/Plan     42 y.o. male with:    1. Familial hypolipoproteinemia  - FibroScan [place] (Vibration Controlled Transient Elastography); Future  - US Abdomen Limited; Future  - Hepatic Function Panel; Future  - Hepatitis A antibody, IgG; Future  - Hepatitis B Surface Ab, Qualitative; Future  - Hepatitis B Surface Antigen; Future  - Hepatitis C Antibody; Future    2. Family history of liver disease    3. Family history of cirrhosis of liver    4. On pre-exposure prophylaxis for HIV  - not long-term, but as needed    Orders Placed This Encounter   Procedures    FibroScan [place] (Vibration Controlled Transient Elastography)    US Abdomen Limited    Hepatic Function Panel    Hepatitis A antibody, IgG    Hepatitis B Surface Ab, Qualitative    Hepatitis B Surface Antigen    Hepatitis C Antibody       We can obtain Fibroscan and ultrasound for further investigation. ALT 40, low cholesterol. Was told may have familial low cholesterol. Father with HOPKINS cirrhosis, liver transplant, . Reasonable to obtain further work-up.  Labs also recommended for viral hepatitis screening  Noted occasional  antiretrovirals, not currently taking however.  Follow up in about 8 weeks (around 6/26/2023).    I spent 45 minutes on the day of this encounter preparing for, evaluating, treating, and managing this patient.     Thank you for allowing me to participate in the care of DANDRE PlattC  Hepatology & Liver Transplant

## 2023-07-03 ENCOUNTER — TELEPHONE (OUTPATIENT)
Dept: HEPATOLOGY | Facility: CLINIC | Age: 43
End: 2023-07-03
Payer: COMMERCIAL

## 2023-07-03 NOTE — TELEPHONE ENCOUNTER
----- Message from Joyce Nguyen sent at 7/3/2023  8:06 AM CDT -----  Regarding: Appt r/s Request  Pt wants to reschedule his appts. He is having car trouble.      Yogesh @ [phone]

## 2023-07-03 NOTE — TELEPHONE ENCOUNTER
Returned call to the patient.  Rescheduled appointment on his request Aug. 29, 2023.  Patient confirmed and agreed with the appointment.  Reminder letter mailed.

## 2023-08-29 ENCOUNTER — OFFICE VISIT (OUTPATIENT)
Dept: HEPATOLOGY | Facility: CLINIC | Age: 43
End: 2023-08-29
Payer: COMMERCIAL

## 2023-08-29 ENCOUNTER — PROCEDURE VISIT (OUTPATIENT)
Dept: HEPATOLOGY | Facility: CLINIC | Age: 43
End: 2023-08-29
Payer: COMMERCIAL

## 2023-08-29 VITALS — BODY MASS INDEX: 25.45 KG/M2 | HEIGHT: 73 IN | WEIGHT: 192 LBS

## 2023-08-29 DIAGNOSIS — Z83.79 FAMILY HISTORY OF CIRRHOSIS OF LIVER: ICD-10-CM

## 2023-08-29 DIAGNOSIS — Z83.79 FAMILY HISTORY OF LIVER DISEASE: ICD-10-CM

## 2023-08-29 DIAGNOSIS — Z79.899 ON PRE-EXPOSURE PROPHYLAXIS FOR HIV: ICD-10-CM

## 2023-08-29 DIAGNOSIS — E78.6: ICD-10-CM

## 2023-08-29 DIAGNOSIS — K76.0 FATTY LIVER: Primary | ICD-10-CM

## 2023-08-29 PROCEDURE — 99999 PR PBB SHADOW E&M-EST. PATIENT-LVL III: CPT | Mod: PBBFAC,,, | Performed by: PHYSICIAN ASSISTANT

## 2023-08-29 PROCEDURE — 1159F PR MEDICATION LIST DOCUMENTED IN MEDICAL RECORD: ICD-10-PCS | Mod: CPTII,S$GLB,, | Performed by: PHYSICIAN ASSISTANT

## 2023-08-29 PROCEDURE — 1159F MED LIST DOCD IN RCRD: CPT | Mod: CPTII,S$GLB,, | Performed by: PHYSICIAN ASSISTANT

## 2023-08-29 PROCEDURE — 3008F BODY MASS INDEX DOCD: CPT | Mod: CPTII,S$GLB,, | Performed by: PHYSICIAN ASSISTANT

## 2023-08-29 PROCEDURE — 99999 PR PBB SHADOW E&M-EST. PATIENT-LVL III: ICD-10-PCS | Mod: PBBFAC,,, | Performed by: PHYSICIAN ASSISTANT

## 2023-08-29 PROCEDURE — 1160F RVW MEDS BY RX/DR IN RCRD: CPT | Mod: CPTII,S$GLB,, | Performed by: PHYSICIAN ASSISTANT

## 2023-08-29 PROCEDURE — 99214 OFFICE O/P EST MOD 30 MIN: CPT | Mod: S$GLB,,, | Performed by: PHYSICIAN ASSISTANT

## 2023-08-29 PROCEDURE — 76981 USE PARENCHYMA: CPT | Mod: S$GLB,,, | Performed by: PHYSICIAN ASSISTANT

## 2023-08-29 PROCEDURE — 99214 PR OFFICE/OUTPT VISIT, EST, LEVL IV, 30-39 MIN: ICD-10-PCS | Mod: S$GLB,,, | Performed by: PHYSICIAN ASSISTANT

## 2023-08-29 PROCEDURE — 76981 FIBROSCAN NEW ORLEANS (VIBRATION CONTROLLED TRANSIENT ELASTOGRAPHY): ICD-10-PCS | Mod: S$GLB,,, | Performed by: PHYSICIAN ASSISTANT

## 2023-08-29 PROCEDURE — 3008F PR BODY MASS INDEX (BMI) DOCUMENTED: ICD-10-PCS | Mod: CPTII,S$GLB,, | Performed by: PHYSICIAN ASSISTANT

## 2023-08-29 PROCEDURE — 1160F PR REVIEW ALL MEDS BY PRESCRIBER/CLIN PHARMACIST DOCUMENTED: ICD-10-PCS | Mod: CPTII,S$GLB,, | Performed by: PHYSICIAN ASSISTANT

## 2023-08-29 NOTE — PROCEDURES
FibroScan [place] (Vibration Controlled Transient Elastography)    Date/Time: 8/29/2023 11:30 AM    Performed by: Dahlia Stoll PA-C  Authorized by: Dahlia Stoll PA-C    Diagnosis:  NAFLD and Alcohol    Probe:  M    Universal Protocol: Patient's identity, procedure and site were verified, confirmatory pause was performed.  Discussed procedure including risks and potential complications.  Questions answered.  Patient verbalizes understanding and wishes to proceed with VCTE.     Procedure: After providing explanations of the procedure, patient was placed in the supine position with right arm in maximum abduction to allow optimal exposure of right lateral abdomen.  Patient was briefly assessed, Testing was performed in the mid-axillary location, 50Hz Shear Wave pulses were applied and the resulting Shear Wave and Propagation Speed detected with a 3.5 MHz ultrasonic signal, using the FibroScan probe, Skin to liver capsule distance and liver parenchyma were accessed during the entire examination with the FibroScan probe, Patient was instructed to breathe normally and to abstain from sudden movements during the procedure, allowing for random measurements of liver stiffness. At least 10 Shear Waves were produced, Individual measurements of each Shear Wave were calculated.  Patient tolerated the procedure well with no complications.  Meets discharge criteria as was dismissed.  Rates pain 0 out of 10.  Patient will follow up with ordering provider to review results.    Findings  Median liver stiffness score:  4.9  CAP Reading: dB/m:  331    IQR/med %:  16  Interpretation  Fibrosis interpretation is based on medial liver stiffness - Kilopascal (kPa).    Fibrosis Stage:  F 0-1  Steatosis interpretation is based on controlled attenuation parameter - (dB/m).    Steatosis Grade:  S3

## 2023-08-29 NOTE — PROGRESS NOTES
"Hepatology Consultation: [facility]    EST PATIENT  PCP: Rafaela Olsen NP    Subjective      Mr. Zuñiga is a 42 y.o. male with PMH as listed below who presents to clinic for family history of liver disease.     In relation to metabolic risk factors:   Outside labs suggest ALT 40, low cholesterol.     No results found for: "LABA1C", "HGBA1C", "CHOL", "TSH"    Body mass index is 25.33 kg/m².    8/3/2023 HFP  Protein,Total 5.8 - 8.0 g/dL 7.9    Albumin 3.7 - 5.1 g/dL 4.0    Globulin 1.6 - 3.8 g/dL 3.9 High     A/G Ratio 1.1 - 2.2 1.0 Low     Bilirubin,Total 0.4 - 1.3 mg/dL 1.1    Alkaline Phosphatase 42 - 121 IU/L 62    ALT (SGPT) 10 - 60 U/L 27    AST (SGOT) 10 - 42 U/L 18        Initial history 2023:  Michael Zuñiga arrives today alone.  He is well-appearing and feels well. Denies symptoms of hepatic decompensation including jaundice, ascites, cognitive problems to suggest hepatic encephalopathy, or GI bleeding.    He reports that he was recently told he may have familial low cholesterol. He was concerned as his father had HOPKINS cirrhosis and recently watched him during the end of his life become very sick. Father saw hepatologist in [place] with liver transplant program but is now , sadly. Mr Zuñiga reports this was distressing and he was not sleeping well for past couple of years, but this has improved recently.    Patient states his sister in law saw his low cholesterol and recommended that he see a liver specialist. There was some talk of a hepatologist in [place] "enrolling him in a HOPKINS study." Patient denies prior fibrosis staging.     For the past couple of months, he has stopped drinking, smoking, and illicit substances. He reports no prior mention of liver disease. He reports no prior hepatitis.     He occasionally will use PrEP if needed with new partners but hasn't taken this in a while.     His primary care is at [facility].    He does " "report occasionally  super fatigued around 3-4pm. 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     In regards to lifestyle, he exercises, he boxes once per week. Cardio and weight lifting. Diet has changed recently as he is more concerned with his health      New rx: denies  ETOH: stopped drinking 2-3 months ago   Smoking: cigarettes   Illicit substances: prior co [facility]  Social: lives in [facility]  Occupation: owns frame shop  Prior liver biopsy: denies   Prior liver disease: denies   Hepatitis vaccination: unknown      Interval history 08/29/2023:  Michael Zuñiga arrives today alone.   Since our last visit, doing okay. On and off drinking alcohol, but has decided to quit completely, on and off PrEP. Recently stopped as no new partners.   Denies symptoms of hepatic decompensation including jaundice, ascites, cognitive problems.  Feels fine today. No complaints  Did not complete labs and ultrasound, canceled appt       [facility] Michael has a past medical history of Chronic diarrhea, Diverticulitis, and Strep pharyngitis.   [facility] Michael has no past surgical history on file.    Michael's family history includes Cirrhosis in his father; Liver disease in his father; No Known Problems in his mother.    Michael reports that he has been smoking cigarettes. He has never used smokeless tobacco. He reports current alcohol use of about 2.0 standard drinks of alcohol per week. He reports that he does not use drugs.   SALOMÓN Rodriguez has No Known Allergies.   QUINCY Rodriguez has a current medication list which includes the following prescription(s): emtricitabine-tenofovir 200-300 mg, alprazolam, cyclobenzaprine, ibuprofen, ketoconazole, methocarbamol, and varenicline.       ROS:   GENERAL: (+) afternoon fatigue, helped with better sleep hygeine  HEENT: Denies yellowing of eyes   CARDIOVASCULAR: Denies edema  GI: Denies abdominal pain  SKIN: Denies rash, itching       Objective     Ht 6' 1" (1.854 m)   Wt 87.1 kg (192 lb 0.3 " "oz)   BMI 25.33 kg/m²     PHYSICAL EXAM:   Friendly man, in no acute distress; alert and oriented to person, place and time  EYES: Sclerae anicteric  SKIN: Warm and dry. No jaundice. No telangectasias noted. No palmar erythema.  NEURO:  Normal gait.   PSYCH:  Memory intact. Thought and speech pattern appropriate. Behavior normal    DIAGNOSTICS:    8/3/2023 HFP  Protein,Total 5.8 - 8.0 g/dL 7.9    Albumin 3.7 - 5.1 g/dL 4.0    Globulin 1.6 - 3.8 g/dL 3.9 High     A/G Ratio 1.1 - 2.2 1.0 Low     Bilirubin,Total 0.4 - 1.3 mg/dL 1.1    Alkaline Phosphatase 42 - 121 IU/L 62    ALT (SGPT) 10 - 60 U/L 27    AST (SGOT) 10 - 42 U/L 18        Prior serologic workup:   No results found for: "SMOOTHMUSCAB", "AMAIFA", "IGGSERUM", "ANASCREEN", "FERRITIN", "FESATURATED", "PETH", "[id number]", "CERULOPLSM", "HEPBSAG", "HEPCAB", "HEPAIGM"    Imaging:    Findings Fibroscan 08/29/2023  Median liver stiffness score:  4.9  CAP Reading: dB/m:  331  IQR/med %:  16  Interpretation  Fibrosis interpretation is based on medial liver stiffness - Kilopascal (kPa).  Fibrosis Stage:  F 0-1  Steatosis interpretation is based on controlled attenuation parameter - (dB/m).  Steatosis Grade:  S3       Assessment/Plan     42 y.o. male with:    1. Fatty liver    2. Familial hypolipoproteinemia  - US Abdomen Limited; Future  - FibroScan [place] (Vibration Controlled Transient Elastography); Future    3. Family history of liver disease    4. Family history of cirrhosis of liver    5. On pre-exposure prophylaxis for HIV  - FibroScan [place] (Vibration Controlled Transient Elastography); Future  - not long-term, but as needed    Orders Placed This Encounter   Procedures    FibroScan [place] (Vibration Controlled Transient Elastography)    US Abdomen Limited       Did not complete ultrasound or labs as recommended  However fibroscan suggests severe steatosis and no fibrosis. We discussed amelioration of lifestyle, including decreasing alcohol " and saturated fats, weight loss, and decreasing processed foods/sugars.  Patient is motivated to change and requests 6 mo check-in with Fibroscan.  I recommend hepatitis screening as prior, declines today and will obtain with [facility].  Follow up in about 6 months (around 2/29/2024). With Fibroscan prior.    I spent 35 minutes on the day of this encounter preparing for, evaluating, treating, and managing this patient.     Thank you for allowing me to participate in the care of DANDRE PlattC  Hepatology & Liver Transplant

## 2023-08-29 NOTE — PATIENT INSTRUCTIONS
Screen for Hepatitis A, B, C and immunization need  Follow-up in 1 year          FATTY LIVER RECOMMENDATIONS:    There is no FDA approved therapy for non-alcoholic fatty liver disease (NAFLD); therefore, lifestyle changes are important:  1. Weight loss goal of 19 lbs  2. Mediterranean diet is recommended that focuses on low-carb, low-sugar, and low-processed foods.  3. Exercise, 5 days per week, 30 minutes per day, as tolerated  4. Recommend good control of cholesterol, blood pressure, blood sugar levels (as these are risk factors for fatty liver). Cholesterol lowering medications including statins are typically safe and beneficial (even if liver enzymes are elevated).    5. Limit alcohol consumption, no more than 1 serving(s) of alcohol in any day (1 serving is 5 ounces of wine, 12 ounces of beer, or 1.5 ounces of liquor). Do not recommend daily alcohol use.        In some people, fatty liver can progress to steatohepatitis (inflammatory fatty liver) and possibly to cirrhosis, putting one at increased risk for liver cancer and liver failure in the future. Lifestyle changes can help to decrease this risk.     If interested in seeing a dietician to create a weight loss plan, contact the dietician team at [facility]: [email or web address].  You can also call to schedule a consult with a dietician: [phone]. *Virtual visits are available with one of our dieticians.     If you have diabetes or high blood pressure, you can meet with a Health  through [facility] program. Let us know if you are interested in a referral.     Ask about our fatty liver/HOPKINS clinical trials if you have fibrosis / scar tissue related to fatty liver.    *If statins are being considered for HLD, statins are safe in patients with liver disease. Statins can be used to treat dyslipidemia in patients with both NAFLD and HOPKINS.      FATTY LIVER DIET TIPS:    ADD OLIVE OIL. I recommend olive oil daily (in salads or  when cooking)  ADD COFFEE. Black coffee has also been found to be potentially beneficial (skip the sweets and creamer). Add 1-2 cups per day,  if you are able to tolerate. Decaf is fine.  BE MINDFUL OF CARBS AND ADDED SUGARS. Try to limit your carb intake to LESS than 30-45 grams of carbs with a meal or LESS than 5-10 grams with any snack (total of any snack foods eaten during that time).   KEEP A FOOD DIARY. Use MyFitness Pal  OR LOSE IT brook to add up your carbs through the day - the most successful folks on weight loss journey TRACK their progress and food.  LIMIT WHAT YOU ARE DRINKING. Do NOT drink any beverages with calories or carbs (this can lead to high blood sugar and weight gain). Also, some of our patients have been very successful with weight loss using the pre made/planned meal planning services that limit calories and portion size (one example is Sensible Meals but there are many out there). Unsweetened black or green tea is fine! Hibiscus tea is also great and refreshing, especially iced.     Tips for low/moderate carbohydrate diet:  3 meals a day made up of the following:  -- Green vegetables, tomatoes, mushrooms, spaghetti squash, cauliflower, meat, poultry, seafood, eggs   -- Beans (1-1.5 cups) or nuts (1/4 cup): can have 1 x a day.  -- Greek yogurt and fermented foods like kefir, kimchi, saurkraut (be careful if you have swelling issues as lots of salt can worsen swelling or blood pressure)  -- Dressings, seasonings, condiments, etc should have less than 2 g sugars.   -- 1-2 servings of citrus fruits, berries, pineapple or melon a day (1/2 cup)  -- Avoid fried foods  -- Avoid grains, rice, pasta, potatoes, bread, corn, peas, oatmeal, grits, tortillas, crackers, chips  -- No soda, sweet tea, juices or lemonade  -- Use olive/avocado oil rather than vegetable oils or butter.        ADDITIONAL RESOURCES:  Websites with information about fatty liver and inflammation related to fatty liver (HOPKINS):  www.nashtruth.com and www.nashactually.com   [facility]: Non-Alcoholic Fatty Liver Disease (NAFLD)    Facebook support group with tips and recipes:   Non-Alcoholic Fatty Liver Disease (NAFLD) Diet & Nutrition Support     Try www.dietdoctor."[employer]" for recipes.

## 2023-10-17 ENCOUNTER — LAB VISIT (OUTPATIENT)
Dept: LAB | Facility: HOSPITAL | Age: 43
End: 2023-10-17
Attending: STUDENT IN AN ORGANIZED HEALTH CARE EDUCATION/TRAINING PROGRAM
Payer: COMMERCIAL

## 2023-10-17 ENCOUNTER — OFFICE VISIT (OUTPATIENT)
Dept: GASTROENTEROLOGY | Facility: CLINIC | Age: 43
End: 2023-10-17
Payer: COMMERCIAL

## 2023-10-17 VITALS
BODY MASS INDEX: 27.14 KG/M2 | DIASTOLIC BLOOD PRESSURE: 75 MMHG | HEIGHT: 72 IN | HEART RATE: 67 BPM | SYSTOLIC BLOOD PRESSURE: 120 MMHG | WEIGHT: 200.38 LBS

## 2023-10-17 DIAGNOSIS — R19.7 DIARRHEA, UNSPECIFIED TYPE: ICD-10-CM

## 2023-10-17 DIAGNOSIS — R19.7 DIARRHEA, UNSPECIFIED TYPE: Primary | ICD-10-CM

## 2023-10-17 LAB
ALBUMIN SERPL BCP-MCNC: 4.1 G/DL (ref 3.5–5.2)
ALP SERPL-CCNC: 62 U/L (ref 55–135)
ALT SERPL W/O P-5'-P-CCNC: 24 U/L (ref 10–44)
ANION GAP SERPL CALC-SCNC: 9 MMOL/L (ref 8–16)
AST SERPL-CCNC: 18 U/L (ref 10–40)
BASOPHILS # BLD AUTO: 0.03 K/UL (ref 0–0.2)
BASOPHILS NFR BLD: 0.4 % (ref 0–1.9)
BILIRUB SERPL-MCNC: 0.4 MG/DL (ref 0.1–1)
BUN SERPL-MCNC: 8 MG/DL (ref 6–20)
CALCIUM SERPL-MCNC: 9.4 MG/DL (ref 8.7–10.5)
CHLORIDE SERPL-SCNC: 105 MMOL/L (ref 95–110)
CO2 SERPL-SCNC: 26 MMOL/L (ref 23–29)
CREAT SERPL-MCNC: 1.1 MG/DL (ref 0.5–1.4)
CRP SERPL-MCNC: 2.1 MG/L (ref 0–8.2)
DIFFERENTIAL METHOD: ABNORMAL
EOSINOPHIL # BLD AUTO: 0 K/UL (ref 0–0.5)
EOSINOPHIL NFR BLD: 0.4 % (ref 0–8)
ERYTHROCYTE [DISTWIDTH] IN BLOOD BY AUTOMATED COUNT: 12.6 % (ref 11.5–14.5)
EST. GFR  (NO RACE VARIABLE): >60 ML/MIN/1.73 M^2
GLUCOSE SERPL-MCNC: 54 MG/DL (ref 70–110)
HCT VFR BLD AUTO: 48.3 % (ref 40–54)
HGB BLD-MCNC: 15.9 G/DL (ref 14–18)
IMM GRANULOCYTES # BLD AUTO: 0.05 K/UL (ref 0–0.04)
IMM GRANULOCYTES NFR BLD AUTO: 0.6 % (ref 0–0.5)
LYMPHOCYTES # BLD AUTO: 2.7 K/UL (ref 1–4.8)
LYMPHOCYTES NFR BLD: 31.7 % (ref 18–48)
MCH RBC QN AUTO: 30.2 PG (ref 27–31)
MCHC RBC AUTO-ENTMCNC: 32.9 G/DL (ref 32–36)
MCV RBC AUTO: 92 FL (ref 82–98)
MONOCYTES # BLD AUTO: 0.7 K/UL (ref 0.3–1)
MONOCYTES NFR BLD: 8.3 % (ref 4–15)
NEUTROPHILS # BLD AUTO: 4.9 K/UL (ref 1.8–7.7)
NEUTROPHILS NFR BLD: 58.6 % (ref 38–73)
NRBC BLD-RTO: 0 /100 WBC
PLATELET # BLD AUTO: 245 K/UL (ref 150–450)
PMV BLD AUTO: 10.3 FL (ref 9.2–12.9)
POTASSIUM SERPL-SCNC: 4.3 MMOL/L (ref 3.5–5.1)
PROT SERPL-MCNC: 7.4 G/DL (ref 6–8.4)
RBC # BLD AUTO: 5.27 M/UL (ref 4.6–6.2)
SODIUM SERPL-SCNC: 140 MMOL/L (ref 136–145)
WBC # BLD AUTO: 8.39 K/UL (ref 3.9–12.7)

## 2023-10-17 PROCEDURE — 3074F PR MOST RECENT SYSTOLIC BLOOD PRESSURE < 130 MM HG: ICD-10-PCS | Mod: CPTII,S$GLB,, | Performed by: STUDENT IN AN ORGANIZED HEALTH CARE EDUCATION/TRAINING PROGRAM

## 2023-10-17 PROCEDURE — 99213 OFFICE O/P EST LOW 20 MIN: CPT | Mod: S$GLB,,, | Performed by: STUDENT IN AN ORGANIZED HEALTH CARE EDUCATION/TRAINING PROGRAM

## 2023-10-17 PROCEDURE — 3078F PR MOST RECENT DIASTOLIC BLOOD PRESSURE < 80 MM HG: ICD-10-PCS | Mod: CPTII,S$GLB,, | Performed by: STUDENT IN AN ORGANIZED HEALTH CARE EDUCATION/TRAINING PROGRAM

## 2023-10-17 PROCEDURE — 99213 PR OFFICE/OUTPT VISIT, EST, LEVL III, 20-29 MIN: ICD-10-PCS | Mod: S$GLB,,, | Performed by: STUDENT IN AN ORGANIZED HEALTH CARE EDUCATION/TRAINING PROGRAM

## 2023-10-17 PROCEDURE — 86140 C-REACTIVE PROTEIN: CPT | Performed by: STUDENT IN AN ORGANIZED HEALTH CARE EDUCATION/TRAINING PROGRAM

## 2023-10-17 PROCEDURE — 99999 PR PBB SHADOW E&M-EST. PATIENT-LVL III: ICD-10-PCS | Mod: PBBFAC,,, | Performed by: STUDENT IN AN ORGANIZED HEALTH CARE EDUCATION/TRAINING PROGRAM

## 2023-10-17 PROCEDURE — 1159F MED LIST DOCD IN RCRD: CPT | Mod: CPTII,S$GLB,, | Performed by: STUDENT IN AN ORGANIZED HEALTH CARE EDUCATION/TRAINING PROGRAM

## 2023-10-17 PROCEDURE — 80053 COMPREHEN METABOLIC PANEL: CPT | Performed by: STUDENT IN AN ORGANIZED HEALTH CARE EDUCATION/TRAINING PROGRAM

## 2023-10-17 PROCEDURE — 99999 PR PBB SHADOW E&M-EST. PATIENT-LVL III: CPT | Mod: PBBFAC,,, | Performed by: STUDENT IN AN ORGANIZED HEALTH CARE EDUCATION/TRAINING PROGRAM

## 2023-10-17 PROCEDURE — 3074F SYST BP LT 130 MM HG: CPT | Mod: CPTII,S$GLB,, | Performed by: STUDENT IN AN ORGANIZED HEALTH CARE EDUCATION/TRAINING PROGRAM

## 2023-10-17 PROCEDURE — 3008F BODY MASS INDEX DOCD: CPT | Mod: CPTII,S$GLB,, | Performed by: STUDENT IN AN ORGANIZED HEALTH CARE EDUCATION/TRAINING PROGRAM

## 2023-10-17 PROCEDURE — 3078F DIAST BP <80 MM HG: CPT | Mod: CPTII,S$GLB,, | Performed by: STUDENT IN AN ORGANIZED HEALTH CARE EDUCATION/TRAINING PROGRAM

## 2023-10-17 PROCEDURE — 86364 TISS TRNSGLTMNASE EA IG CLAS: CPT | Performed by: STUDENT IN AN ORGANIZED HEALTH CARE EDUCATION/TRAINING PROGRAM

## 2023-10-17 PROCEDURE — 85025 COMPLETE CBC W/AUTO DIFF WBC: CPT | Performed by: STUDENT IN AN ORGANIZED HEALTH CARE EDUCATION/TRAINING PROGRAM

## 2023-10-17 PROCEDURE — 1159F PR MEDICATION LIST DOCUMENTED IN MEDICAL RECORD: ICD-10-PCS | Mod: CPTII,S$GLB,, | Performed by: STUDENT IN AN ORGANIZED HEALTH CARE EDUCATION/TRAINING PROGRAM

## 2023-10-17 PROCEDURE — 3008F PR BODY MASS INDEX (BMI) DOCUMENTED: ICD-10-PCS | Mod: CPTII,S$GLB,, | Performed by: STUDENT IN AN ORGANIZED HEALTH CARE EDUCATION/TRAINING PROGRAM

## 2023-10-17 PROCEDURE — 36415 COLL VENOUS BLD VENIPUNCTURE: CPT | Performed by: STUDENT IN AN ORGANIZED HEALTH CARE EDUCATION/TRAINING PROGRAM

## 2023-10-17 NOTE — PROGRESS NOTES
[facility] Follow-UP Note    Reason for Follow-Up:  The encounter diagnosis was Diarrhea, unspecified type.    PCP:   Rafaela Olsen   No address on file        HPI:  This is a 42 y.o. male last seen in GI clinic on Dec 15, 2021 for diarrhea.  He was scheduled for a colonoscopy to obtain biopsies and treated symptomatically.  Per chart review, it does not seem that he has had his colonoscopy completed.    Interval History:  Notes having strep throat this past summer and developed a christine-tonsillar abscess.  He was put on antibiotics at that time and has had recurrent diarrhea.  In the last 4 months he has had significant diarrhea.  Stools tend to vary from explosive liquid vs loose stool.  He has had a few formed bowel movements, but not noting any improvement.     Denies any blood in his stool.      He has tried various pre-biotics, dietary modifications, and imodium.  He has not noted any relief and feels that he is reaching his wits end.    Recent C. Diff testing has been negative at [facility].    Objective Findings:    Vital Signs:  /75   Pulse 67   Ht 6' (1.829 m)   Wt 90.9 kg (200 lb 6.4 oz)   BMI 27.18 kg/m²   Body mass index is 27.18 kg/m².    Physical Exam:  General Appearance: Well appearing in no acute distress    Assessment:  1. Diarrhea, unspecified type      In brief, the patient is a 42-year-old man who presents to GI clinic with chronic diarrhea.    I suspect the patient's symptoms are largely through his recurrent antibiotic use.  He is had a similar presentation in the past at which time infectious evaluation was unremarkable.  I will obtain a stool culture and C diff today.  I also think it is reasonable to proceed with a colonoscopy to assess for microscopic colitis and rule out inflammatory bowel disease.  We will check a CBC, CMP, CRP and celiac panel today.    In the interim, I have recommended he begin daily fiber supplement and use Imodium twice daily.  He  will contact my office if symptoms persist despite scheduled Imodium and we will escalate therapy to either Lomotil or cholestyramine.    I have also consider small intestinal bacterial overgrowth his etiology for symptoms.  Pending the above evaluation we will consider diagnostic testing with a breath test.    Follow up in about 3 months (around 1/17/2024).      Order summary:  Orders Placed This Encounter    Stool culture    C Diff Toxin by PCR    Celiac Disease Panel    CBC Auto Differential    Comprehensive Metabolic Panel    C-REACTIVE PROTEIN         Thank you so much for allowing me to participate in the care of Michael Vital MD

## 2023-10-18 ENCOUNTER — LAB VISIT (OUTPATIENT)
Dept: LAB | Facility: HOSPITAL | Age: 43
End: 2023-10-18
Attending: STUDENT IN AN ORGANIZED HEALTH CARE EDUCATION/TRAINING PROGRAM
Payer: COMMERCIAL

## 2023-10-18 DIAGNOSIS — R19.7 DIARRHEA, UNSPECIFIED TYPE: ICD-10-CM

## 2023-10-18 LAB — C DIFF TOX GENS STL QL NAA+PROBE: NEGATIVE

## 2023-10-18 PROCEDURE — 87427 SHIGA-LIKE TOXIN AG IA: CPT | Mod: 59 | Performed by: STUDENT IN AN ORGANIZED HEALTH CARE EDUCATION/TRAINING PROGRAM

## 2023-10-18 PROCEDURE — 87046 STOOL CULTR AEROBIC BACT EA: CPT | Performed by: STUDENT IN AN ORGANIZED HEALTH CARE EDUCATION/TRAINING PROGRAM

## 2023-10-18 PROCEDURE — 87493 C DIFF AMPLIFIED PROBE: CPT | Performed by: STUDENT IN AN ORGANIZED HEALTH CARE EDUCATION/TRAINING PROGRAM

## 2023-10-18 PROCEDURE — 87045 FECES CULTURE AEROBIC BACT: CPT | Mod: 59 | Performed by: STUDENT IN AN ORGANIZED HEALTH CARE EDUCATION/TRAINING PROGRAM

## 2023-10-18 PROCEDURE — 87449 NOS EACH ORGANISM AG IA: CPT | Performed by: STUDENT IN AN ORGANIZED HEALTH CARE EDUCATION/TRAINING PROGRAM

## 2023-10-19 ENCOUNTER — PATIENT MESSAGE (OUTPATIENT)
Dept: GASTROENTEROLOGY | Facility: CLINIC | Age: 43
End: 2023-10-19
Payer: COMMERCIAL

## 2023-10-19 LAB — C DIFF TOX GENS STL QL NAA+PROBE: NEGATIVE

## 2023-10-20 ENCOUNTER — TELEPHONE (OUTPATIENT)
Dept: ENDOSCOPY | Facility: HOSPITAL | Age: 43
End: 2023-10-20
Payer: COMMERCIAL

## 2023-10-20 LAB
E COLI SXT1 STL QL IA: NEGATIVE
E COLI SXT2 STL QL IA: NEGATIVE
GLIADIN PEPTIDE IGA SER-ACNC: 4.9 U/ML
GLIADIN PEPTIDE IGG SER-ACNC: 1.1 U/ML
IGA SERPL-MCNC: 277 MG/DL (ref 70–400)
TTG IGA SER-ACNC: 0.8 U/ML
TTG IGG SER-ACNC: 0.9 U/ML

## 2023-10-20 RX ORDER — CHOLESTYRAMINE 4 G/9G
4 POWDER, FOR SUSPENSION ORAL DAILY
Qty: 30 PACKET | Refills: 5 | Status: SHIPPED | OUTPATIENT
Start: 2023-10-20 | End: 2023-11-01 | Stop reason: SDUPTHER

## 2023-10-20 NOTE — TELEPHONE ENCOUNTER
Contacted the patient to schedule an endoscopy procedure(s) 10/20/23. The patient did not answer the call and left a voice message requesting a call back.

## 2023-10-20 NOTE — TELEPHONE ENCOUNTER
"----- Message from Sienna Camejo sent at 10/18/2023 11:13 AM CDT -----  Regarding: FW: Endo Case Request    ----- Message -----  From: Jaime Vital MD  Sent: 10/17/2023   1:47 PM CDT  To: [facility] Patients  Subject: Endo Case Request                                Procedure: Colonoscopy    Diagnosis: Diarrhea    Procedure Timin-4 weeks    #If within 4 weeks selected, please camille as high priority#    #If greater than 12 weeks, please select "5-12 weeks" and delay sending until 2 months prior to requested date#     Provider: Myself    Location: No Preference    Additional Scheduling Information: No scheduling concerns    Prep Specifications:Standard prep    Is the patient taking a GLP-1 Agonist:no    Have you attached a patient to this message: yes        "

## 2023-10-21 ENCOUNTER — TELEPHONE (OUTPATIENT)
Dept: ENDOSCOPY | Facility: HOSPITAL | Age: 43
End: 2023-10-21
Payer: COMMERCIAL

## 2023-10-21 VITALS — WEIGHT: 190 LBS | BODY MASS INDEX: 25.73 KG/M2 | HEIGHT: 72 IN

## 2023-10-21 DIAGNOSIS — Z12.11 SPECIAL SCREENING FOR MALIGNANT NEOPLASMS, COLON: Primary | ICD-10-CM

## 2023-10-21 DIAGNOSIS — R19.7 DIARRHEA, UNSPECIFIED TYPE: ICD-10-CM

## 2023-10-21 LAB — BACTERIA STL CULT: NORMAL

## 2023-10-21 NOTE — TELEPHONE ENCOUNTER
Spoke to Mukesh to schedule procedure(s) Colonoscopy       Physician to perform procedure(s) Dr. LUIS Gonzales  Date of Procedure (s) 10/23/23  Arrival Time 12:15 PM  Time of Procedure(s) 1:15 PM   Location of Procedure(s) [facility] 2nd Floor  Type of Rx Prep sent to patient: Miralax  Instructions provided to [facility] via [facility]    Patient was informed on the following information and verbalized understanding. Screening questionnaire reviewed with patient and complete. If procedure requires anesthesia, a responsible adult needs to be present to accompany the patient home, patient cannot drive after receiving anesthesia. Appointment details are tentative, especially check-in time. Patient will receive a prep-op call 4 days prior to confirm check-in time for procedure. If applicable the patient should contact their pharmacy to verify Rx for procedure prep is ready for pick-up. Patient was advised to call the scheduling department at [phone] if pharmacy states no Rx is available. Patient was advised to call the endoscopy scheduling department if any questions or concerns arise.      [facility] Scheduling Department

## 2023-10-21 NOTE — TELEPHONE ENCOUNTER
"----- Message from Sienna Camejo sent at 10/18/2023 11:13 AM CDT -----  Regarding: FW: Endo Case Request    ----- Message -----  From: Jaime Vital MD  Sent: 10/17/2023   1:47 PM CDT  To: [facility] Patients  Subject: Endo Case Request                                Procedure: Colonoscopy    Diagnosis: Diarrhea    Procedure Timin-4 weeks    #If within 4 weeks selected, please camille as high priority#    #If greater than 12 weeks, please select "5-12 weeks" and delay sending until 2 months prior to requested date#     Provider: Myself    Location: No Preference    Additional Scheduling Information: No scheduling concerns    Prep Specifications:Standard prep    Is the patient taking a GLP-1 Agonist:no    Have you attached a patient to this message: yes        "

## 2023-10-23 ENCOUNTER — TELEPHONE (OUTPATIENT)
Dept: ENDOSCOPY | Facility: HOSPITAL | Age: 43
End: 2023-10-23
Payer: COMMERCIAL

## 2023-10-23 VITALS — WEIGHT: 190 LBS | HEIGHT: 72 IN | BODY MASS INDEX: 25.73 KG/M2

## 2023-10-23 DIAGNOSIS — Z12.11 SPECIAL SCREENING FOR MALIGNANT NEOPLASMS, COLON: Primary | ICD-10-CM

## 2023-10-23 NOTE — TELEPHONE ENCOUNTER
Spoke to patient to schedule procedure(s) Colonoscopy       Physician to perform procedure(s) Dr. GERMAIN Pabon  Date of Procedure (s) 10/25/23  Arrival Time 8:00 AM  Time of Procedure(s) 9:00 AM   Location of Procedure(s) [place] 2nd Floor  Type of Rx Prep sent to patient: PEG  Instructions provided to [facility] via [facility]    Patient was informed on the following information and verbalized understanding. Screening questionnaire reviewed with patient and complete. If procedure requires anesthesia, a responsible adult needs to be present to accompany the patient home, patient cannot drive after receiving anesthesia. Appointment details are tentative, especially check-in time. Patient will receive a prep-op call 4 days prior to confirm check-in time for procedure. If applicable the patient should contact their pharmacy to verify Rx for procedure prep is ready for pick-up. Patient was advised to call the scheduling department at [phone] if pharmacy states no Rx is available. Patient was advised to call the endoscopy scheduling department if any questions or concerns arise.      [facility]oscopy Scheduling Department

## 2023-10-24 ENCOUNTER — PATIENT MESSAGE (OUTPATIENT)
Dept: GASTROENTEROLOGY | Facility: CLINIC | Age: 43
End: 2023-10-24
Payer: COMMERCIAL

## 2023-10-25 ENCOUNTER — PATIENT MESSAGE (OUTPATIENT)
Dept: ENDOSCOPY | Facility: HOSPITAL | Age: 43
End: 2023-10-25
Payer: COMMERCIAL

## 2023-11-01 RX ORDER — CHOLESTYRAMINE 4 G/9G
4 POWDER, FOR SUSPENSION ORAL DAILY
Qty: 30 PACKET | Refills: 5 | Status: SHIPPED | OUTPATIENT
Start: 2023-11-01 | End: 2024-10-31

## 2023-11-03 ENCOUNTER — TELEPHONE (OUTPATIENT)
Dept: ENDOSCOPY | Facility: HOSPITAL | Age: 43
End: 2023-11-03
Payer: COMMERCIAL

## 2023-11-03 NOTE — TELEPHONE ENCOUNTER
----- Message from Sienna Camejo sent at 11/3/2023 11:41 AM CDT -----    ----- Message -----  From: Skyler Rogers  Sent: 11/3/2023  11:22 AM CDT  To: [facility] Endo Schedulers    Type:  Patient Returning Call    Who Called:pt   Who Left Message for Patient:  Does the patient know what this is regarding?:appt  Would the patient rather a call back or a response via [facility]? call  Best Call Back Number:[phone]  Additional Information: pt is calling to reschedule colonoscopy procedure. The pt states that he would like something in December  if possible

## 2023-11-03 NOTE — TELEPHONE ENCOUNTER
Spoke to pt to schedule procedure(s) Colonoscopy       Physician to perform procedure(s) Dr. ARASH Vital  Date of Procedure (s) 01/11/24  Arrival Time 1:30 PM  Time of Procedure(s) 2:30 PM   Location of Procedure(s) [place] 2nd Floor  Type of Rx Prep sent to patient: PEG  Instructions provided to [facility] via [facility]    Patient was informed on the following information and verbalized understanding. Screening questionnaire reviewed with patient and complete. If procedure requires anesthesia, a responsible adult needs to be present to accompany the patient home, patient cannot drive after receiving anesthesia. Appointment details are tentative, especially check-in time. Patient will receive a prep-op call 4 days prior to confirm check-in time for procedure. If applicable the patient should contact their pharmacy to verify Rx for procedure prep is ready for pick-up. Patient was advised to call the scheduling department at [phone] if pharmacy states no Rx is available. Patient was advised to call the endoscopy scheduling department if any questions or concerns arise.      [facility]oscopy Scheduling Department

## 2024-01-04 ENCOUNTER — TELEPHONE (OUTPATIENT)
Dept: ENDOSCOPY | Facility: HOSPITAL | Age: 44
End: 2024-01-04
Payer: COMMERCIAL

## 2024-01-08 ENCOUNTER — TELEPHONE (OUTPATIENT)
Dept: ENDOSCOPY | Facility: HOSPITAL | Age: 44
End: 2024-01-08
Payer: COMMERCIAL

## 2024-01-08 NOTE — TELEPHONE ENCOUNTER
Left 2nd voicemail for patient to call Endoscopy Scheduling to review instructions and confirm appointment for Colonoscopy on 1/11/24.

## 2024-04-18 ENCOUNTER — TELEPHONE (OUTPATIENT)
Dept: ENDOSCOPY | Facility: HOSPITAL | Age: 44
End: 2024-04-18
Payer: COMMERCIAL

## 2024-04-18 ENCOUNTER — PATIENT MESSAGE (OUTPATIENT)
Dept: GASTROENTEROLOGY | Facility: CLINIC | Age: 44
End: 2024-04-18
Payer: COMMERCIAL

## 2024-04-19 ENCOUNTER — TELEPHONE (OUTPATIENT)
Dept: ENDOSCOPY | Facility: HOSPITAL | Age: 44
End: 2024-04-19
Payer: COMMERCIAL

## 2025-09-02 ENCOUNTER — TELEPHONE (OUTPATIENT)
Dept: HEPATOLOGY | Facility: CLINIC | Age: 45
End: 2025-09-02
Payer: COMMERCIAL